# Patient Record
Sex: FEMALE | Race: WHITE | NOT HISPANIC OR LATINO | Employment: FULL TIME | URBAN - METROPOLITAN AREA
[De-identification: names, ages, dates, MRNs, and addresses within clinical notes are randomized per-mention and may not be internally consistent; named-entity substitution may affect disease eponyms.]

---

## 2023-02-15 ENCOUNTER — TELEPHONE (OUTPATIENT)
Dept: OBGYN CLINIC | Facility: HOSPITAL | Age: 58
End: 2023-02-15

## 2023-02-15 NOTE — TELEPHONE ENCOUNTER
Patient is being referred to a orthopedics. Please schedule accordingly.     [address] [facility]   [phone]

## 2023-02-20 ENCOUNTER — HOSPITAL ENCOUNTER (OUTPATIENT)
Dept: RADIOLOGY | Facility: HOSPITAL | Age: 58
Discharge: HOME | End: 2023-02-20
Attending: STUDENT IN AN ORGANIZED HEALTH CARE EDUCATION/TRAINING PROGRAM

## 2023-02-20 ENCOUNTER — OFFICE VISIT (OUTPATIENT)
Dept: LAB | Facility: HOSPITAL | Age: 58
End: 2023-02-20
Attending: STUDENT IN AN ORGANIZED HEALTH CARE EDUCATION/TRAINING PROGRAM

## 2023-02-20 ENCOUNTER — OFFICE VISIT (OUTPATIENT)
Dept: OBGYN CLINIC | Facility: CLINIC | Age: 58
End: 2023-02-20

## 2023-02-20 ENCOUNTER — HOSPITAL ENCOUNTER (OUTPATIENT)
Dept: CT IMAGING | Facility: HOSPITAL | Age: 58
Discharge: HOME | End: 2023-02-20
Attending: STUDENT IN AN ORGANIZED HEALTH CARE EDUCATION/TRAINING PROGRAM

## 2023-02-20 VITALS
HEIGHT: 69 IN | HEART RATE: 73 BPM | DIASTOLIC BLOOD PRESSURE: 85 MMHG | WEIGHT: 252.3 LBS | BODY MASS INDEX: 37.37 KG/M2 | SYSTOLIC BLOOD PRESSURE: 154 MMHG

## 2023-02-20 DIAGNOSIS — S52.602A CLOSED FRACTURE DISTAL RADIUS AND ULNA, LEFT, INITIAL ENCOUNTER: ICD-10-CM

## 2023-02-20 DIAGNOSIS — S52.502A CLOSED FRACTURE DISTAL RADIUS AND ULNA, LEFT, INITIAL ENCOUNTER: ICD-10-CM

## 2023-02-20 DIAGNOSIS — S52.502A CLOSED FRACTURE DISTAL RADIUS AND ULNA, LEFT, INITIAL ENCOUNTER: Primary | ICD-10-CM

## 2023-02-20 DIAGNOSIS — S52.602A CLOSED FRACTURE DISTAL RADIUS AND ULNA, LEFT, INITIAL ENCOUNTER: Primary | ICD-10-CM

## 2023-02-20 DIAGNOSIS — S52.502A DISTAL RADIUS FRACTURE, LEFT: ICD-10-CM

## 2023-02-20 LAB
ANION GAP SERPL CALCULATED.3IONS-SCNC: 8 MMOL/L (ref 4–13)
BASOPHILS # BLD AUTO: 0.02 THOUSANDS/ÂΜL (ref 0–0.1)
BASOPHILS NFR BLD AUTO: 0 % (ref 0–1)
BUN SERPL-MCNC: 13 MG/DL (ref 5–25)
CALCIUM SERPL-MCNC: 9.5 MG/DL (ref 8.4–10.2)
CHLORIDE SERPL-SCNC: 107 MMOL/L (ref 96–108)
CO2 SERPL-SCNC: 27 MMOL/L (ref 21–32)
CREAT SERPL-MCNC: 0.71 MG/DL (ref 0.6–1.3)
EOSINOPHIL # BLD AUTO: 0.07 THOUSAND/ÂΜL (ref 0–0.61)
EOSINOPHIL NFR BLD AUTO: 1 % (ref 0–6)
ERYTHROCYTE [DISTWIDTH] IN BLOOD BY AUTOMATED COUNT: 13.2 % (ref 11.6–15.1)
GFR SERPL CREATININE-BSD FRML MDRD: 94 ML/MIN/1.73SQ M
GLUCOSE P FAST SERPL-MCNC: 103 MG/DL (ref 65–99)
HCT VFR BLD AUTO: 39.7 % (ref 34.8–46.1)
HGB BLD-MCNC: 13 G/DL (ref 11.5–15.4)
IMM GRANULOCYTES # BLD AUTO: 0.02 THOUSAND/UL (ref 0–0.2)
IMM GRANULOCYTES NFR BLD AUTO: 0 % (ref 0–2)
LYMPHOCYTES # BLD AUTO: 2.52 THOUSANDS/ÂΜL (ref 0.6–4.47)
LYMPHOCYTES NFR BLD AUTO: 32 % (ref 14–44)
MCH RBC QN AUTO: 29.5 PG (ref 26.8–34.3)
MCHC RBC AUTO-ENTMCNC: 32.7 G/DL (ref 31.4–37.4)
MCV RBC AUTO: 90 FL (ref 82–98)
MONOCYTES # BLD AUTO: 0.62 THOUSAND/ÂΜL (ref 0.17–1.22)
MONOCYTES NFR BLD AUTO: 8 % (ref 4–12)
NEUTROPHILS # BLD AUTO: 4.69 THOUSANDS/ÂΜL (ref 1.85–7.62)
NEUTS SEG NFR BLD AUTO: 59 % (ref 43–75)
NRBC BLD AUTO-RTO: 0 /100 WBCS
PLATELET # BLD AUTO: 347 THOUSANDS/UL (ref 149–390)
PMV BLD AUTO: 8.7 FL (ref 8.9–12.7)
POTASSIUM SERPL-SCNC: 3.9 MMOL/L (ref 3.5–5.3)
RBC # BLD AUTO: 4.41 MILLION/UL (ref 3.81–5.12)
SODIUM SERPL-SCNC: 142 MMOL/L (ref 135–147)
WBC # BLD AUTO: 7.94 THOUSAND/UL (ref 4.31–10.16)

## 2023-02-20 RX ORDER — CHLORHEXIDINE GLUCONATE 0.12 MG/ML
15 RINSE ORAL ONCE
Status: CANCELLED | OUTPATIENT
Start: 2023-02-20 | End: 2023-02-20

## 2023-02-20 NOTE — H&P (VIEW-ONLY)
ORTHOPAEDIC HAND, WRIST, AND ELBOW OFFICE  VISIT       ASSESSMENT/PLAN:      Diagnoses and all orders for this visit:    Closed fracture distal radius and ulna, left, initial encounter  -     XR wrist 3+ vw left; Future  -     Cancel: CT upper extremity w wo contrast left; Future  -     Splint application  -     Case request operating room: OPEN REDUCTION W/ INTERNAL FIXATION (ORIF) RADIUS; Standing  -     Basic metabolic panel; Future  -     CBC and differential; Future  -     EKG 12 lead; Future  -     Case request operating room: OPEN REDUCTION W/ INTERNAL FIXATION (ORIF) RADIUS    Distal radius fracture, left  -     Ambulatory Referral to Orthopedic Surgery    Other orders  -     Cancel: Fracture / Dislocation Treatment  -     Diet NPO; Sips with meds; Standing  -     Nursing Communication [address] [place] [address] Interventions Implemented; Standing  -     Nursing Communication [facility] bath, have staff wash entire body (neck down) per pre-op bathing protocol  Routine, evening prior to, and day of surgery ; Standing  -     Nursing Communication Swab both nares with Povidone-Iodine solution, EXCLUDE if patient has shellfish/Iodine allergy  Routine, day of surgery, on call to OR; Standing  -     chlorhexidine (PERIDEX) 0 12 % oral rinse 15 mL  -     Void on call to OR; Standing  -     Insert peripheral IV; Standing  -     ceFAZolin (ANCEF) 2,000 mg in dextrose 5 % 100 mL IVPB      61year old female with left distal radius fracture, right elbow and right wrist pain all sustained after a fall on 2/15/2023  Due to displacement of fracture fragment, as well as intraarticular involvement with volar translation of fracture fragments, recommend operative ORIF of left wrist distal radius fracture, which the patient is agreeable to  Risks and benefits were discussed   Risks of the surgery are inclusive of but not limited to bleeding, infection, nerve injury, blood clot, worsening of symptoms, not achieving the anticipated results, persistent stiffness, weakness and the need for additional surgery  The patient verbally stated they understood those risks and would like to proceed with the surgery  Surgical consent was signed in the office today  I will see the patient the day of surgery  May return to work without use of left hand  Order placed for patient to obtain STAT CT scan of left wrist to further evaluate fracture fragments and their alignment prior to surgery  Patient placed in Orthoglass sugar tong splint today to be worn at all times  Follow up post op        The patient verbalized understanding of exam findings and treatment plan  We engaged in the shared decision-making process and treatment options were discussed at length with the patient  Surgical and conservative management discussed today along with risks and benefits  Follow Up:  Return for post-op with x-rays  Raj Jacobs MD  Attending, Orthopaedic Surgery  Hand, Wrist, and Elbow Surgery  [address] [place] [address]    ____________________________________________________________________________________________________________________________________________      CHIEF COMPLAINT:  Chief Complaint   Patient presents with   • Left Wrist - Pain   • Right Wrist - Pain   • Right Elbow - Pain       SUBJECTIVE:  Reg Mckee is a 61y o  year old RHD female who presents today for evaluation and treatment of left distal radius and ulnar styloid process fractures sustained 2/15/2023  She states she fell forwards onto her bilateral wrists, also is experiencing right elbow pain, right wrist pain, ribcage pain  Immediate pain and dysfunction led her to seek care at the ED where x-rays were obtained, a toradol injection was administered, and she was placed into a short arm splint for the right distal radius fractures  She was referred by Dr Tiffanie Yao DO for follow up evaluation and treatment of her wrist fractures   Today she notes pain of the right elbow, right wrist, left wrist  Denies paraesthesias bilateral upper extremities  Right wrist pain aggravated with movement, describes pain palmar and ulnar aspects of the wrist         I have personally reviewed all the relevant PMH, PSH, SH, FH, Medications and allergies      PAST MEDICAL HISTORY:  Past Medical History:   Diagnosis Date   • Bilateral fibrocystic breast changes    • Costochondritis    • Dense breasts    • Headache    • Hemorrhoids    • Loss of height    • Sinusitis    • Urinary frequency    • Wears glasses    • Wears glasses        PAST SURGICAL HISTORY:  Past Surgical History:   Procedure Laterality Date   • COLONOSCOPY     • INCONTINENCE SURGERY     • NM COLONOSCOPY FLX DX W/COLLJ SPEC WHEN PFRMD N/A 10/4/2018    Procedure: COLONOSCOPY;  Surgeon: Dee Barajas MD;  [facility];   Service: Gastroenterology   • TUBAL LIGATION     • UMBILICAL HERNIA REPAIR         FAMILY HISTORY:  Family History   Problem Relation Age of Onset   • Anemia Mother    • Breast cancer Mother         AGE DX UNK   • Coronary artery disease Father    • Melanoma Father         malignant of skin AGE DX UNK   • Leukemia Father         AGE DX UNK   • Breast cancer Sister         AGE DX UNK   • No Known Problems Daughter    • No Known Problems Maternal Grandmother    • No Known Problems Maternal Grandfather    • No Known Problems Paternal Grandmother    • No Known Problems Paternal Grandfather    • No Known Problems Sister    • No Known Problems Brother    • No Known Problems Daughter    • Ovarian cancer Maternal Aunt 79   • Melanoma Paternal Uncle    • No Known Problems Paternal Aunt    • No Known Problems Paternal Aunt        SOCIAL HISTORY:  Social History     Tobacco Use   • Smoking status: Never   • Smokeless tobacco: Never   Vaping Use   • Vaping Use: Never used   Substance Use Topics   • Alcohol use: Yes     Comment: occasional weekends   • Drug use: No       MEDICATIONS:    Current Outpatient Medications:   •  Cholecalciferol (VITAMIN D3) 1000 units CAPS, Take 2,000 Units by mouth daily  , Disp: , Rfl:   •  co-enzyme Q-10 30 MG capsule, Take 30 mg by mouth 3 (three) times a day, Disp: , Rfl:   •  Garlic 0803 MG CAPS, Take by mouth, Disp: , Rfl:   •  multivitamin (THERAGRAN) TABS, Take 1 tablet by mouth daily, Disp: , Rfl:   •  Omega-3 Fatty Acids (fish oil) 1,000 mg, Take 1,000 mg by mouth daily, Disp: , Rfl:   •  predniSONE 10 mg tablet, Take 4 tablets for 2 days, 3 tablets for 2 days, 2 tablets for 2 days, 1 tablet for 2 days, 0 5 tablet for 2 days, Disp: 20 tablet, Rfl: 0  •  triamcinolone (KENALOG) 0 5 % cream, Apply topically 3 (three) times a day, Disp: 454 g, Rfl: 0    ALLERGIES:  No Known Allergies      Review of Systems  Pertinent items are noted in HPI  All other systems were reviewed and are negative  VITALS:  Vitals:    02/20/23 1127   BP: 154/85   Pulse: 73       LABS:  HgA1c:   Lab Results   Component Value Date    HGBA1C 5 8 09/19/2018     BMP:   Lab Results   Component Value Date    CALCIUM 9 5 02/20/2023    K 3 9 02/20/2023    CO2 27 02/20/2023     02/20/2023    BUN 13 02/20/2023    CREATININE 0 71 02/20/2023       _____________________________________________________  PHYSICAL EXAMINATION:  General: well developed and well nourished, alert, oriented times 3 and appears comfortable  Psychiatric: Normal  HEENT: Normocephalic, Atraumatic Trachea Midline, No torticollis  Pulmonary: No audible wheezing or respiratory distress   Abdomen/GI: Non tender, non distended   Cardiovascular: No pitting edema, 2+ radial pulse   Skin: No masses, erythema, lacerations, fluctation, ulcerations  Neurovascular: Sensation Intact to the Median, Ulnar, Radial Nerve, Motor Intact to the Median, Ulnar, Radial Nerve and Pulses Intact  Musculoskeletal: Normal, except as noted in detailed exam and in HPI        MUSCULOSKELETAL EXAMINATION:    Right wrist:  Ecchymosis, minor edema noted volar and dorsal aspects  Wrist flexion limited due to pain  Able to achieve composite fist  Brisk capillary refill  Sensation intact to Ax/R/M/U nerve distributions    Right elbow:  No visible deformity, ecchymosis  Minor edema  Range of motion , able to achieve pronation and supination with pain at end range of supination  Tenderness with palpation of cubital fossa    Left wrist:  Moderate ecchymosis, edema  Tender with palpation of fracture site  Able to achieve minimal range of motion of digits  Brisk capillary refill  Sensation intact to Ax/R/M/U nerve distributions      ___________________________________________________  STUDIES REVIEWED:  X-rays of the left wrist were reviewed and demonstrate: ulnar styloid process, intra-articular distal radius fracture with volar displacement of fracture fragment  X-rays of the right wrist were reviewed and demonstrate: no acute osseous abnormalities  X-rays of the right elbow were reviewed and demonstrate:    PROCEDURES PERFORMED:  Splint application    Date/Time: 2/20/2023 12:38 PM  Performed by: Ramsey Hannah MD  Authorized by: Ramsey Hannah MD   Universal Protocol:  Consent: Verbal consent obtained  Risks and benefits: risks, benefits and alternatives were discussed  Consent given by: patient  Time out: Immediately prior to procedure a "time out" was called to verify the correct patient, procedure, equipment, support staff and site/side marked as required  Patient understanding: patient states understanding of the procedure being performed  Site marked: the operative site was marked  Patient identity confirmed: verbally with patient      Pre-procedure details:     Sensation:  Normal  Procedure details:     Laterality:  Left    Location:  Wrist    Wrist:  L wrist    Splint type:  Sugar tong    Supplies:  Ortho-Glass, elastic bandage, skin protective strip and cotton padding  Post-procedure details:     Pain:  Unchanged    Sensation:  Normal    Patient tolerance of procedure:   Tolerated well, no immediate complications           _____________________________________________________      Mahad Ken    I,:  Jasmin Bagley am acting as a scribe while in the presence of the attending physician :       I,:  Krysten Johnston MD personally performed the services described in this documentation    as scribed in my presence :

## 2023-02-20 NOTE — PROGRESS NOTES
ORTHOPAEDIC HAND, WRIST, AND ELBOW OFFICE  VISIT       ASSESSMENT/PLAN:      Diagnoses and all orders for this visit:    Closed fracture distal radius and ulna, left, initial encounter  -     XR wrist 3+ vw left; Future  -     Cancel: CT upper extremity w wo contrast left; Future  -     Splint application  -     Case request operating room: OPEN REDUCTION W/ INTERNAL FIXATION (ORIF) RADIUS; Standing  -     Basic metabolic panel; Future  -     CBC and differential; Future  -     EKG 12 lead; Future  -     Case request operating room: OPEN REDUCTION W/ INTERNAL FIXATION (ORIF) RADIUS    Distal radius fracture, left  -     Ambulatory Referral to Orthopedic Surgery    Other orders  -     Cancel: Fracture / Dislocation Treatment  -     Diet NPO; Sips with meds; Standing  -     Nursing Communication [address] [place] [address] Interventions Implemented; Standing  -     Nursing Communication [facility] bath, have staff wash entire body (neck down) per pre-op bathing protocol  Routine, evening prior to, and day of surgery ; Standing  -     Nursing Communication Swab both nares with Povidone-Iodine solution, EXCLUDE if patient has shellfish/Iodine allergy  Routine, day of surgery, on call to OR; Standing  -     chlorhexidine (PERIDEX) 0 12 % oral rinse 15 mL  -     Void on call to OR; Standing  -     Insert peripheral IV; Standing  -     ceFAZolin (ANCEF) 2,000 mg in dextrose 5 % 100 mL IVPB      61year old female with left distal radius fracture, right elbow and right wrist pain all sustained after a fall on 2/15/2023  Due to displacement of fracture fragment, as well as intraarticular involvement with volar translation of fracture fragments, recommend operative ORIF of left wrist distal radius fracture, which the patient is agreeable to  Risks and benefits were discussed   Risks of the surgery are inclusive of but not limited to bleeding, infection, nerve injury, blood clot, worsening of symptoms, not achieving the anticipated results, persistent stiffness, weakness and the need for additional surgery  The patient verbally stated they understood those risks and would like to proceed with the surgery  Surgical consent was signed in the office today  I will see the patient the day of surgery  May return to work without use of left hand  Order placed for patient to obtain STAT CT scan of left wrist to further evaluate fracture fragments and their alignment prior to surgery  Patient placed in Orthoglass sugar tong splint today to be worn at all times  Follow up post op        The patient verbalized understanding of exam findings and treatment plan  We engaged in the shared decision-making process and treatment options were discussed at length with the patient  Surgical and conservative management discussed today along with risks and benefits  Follow Up:  Return for post-op with x-rays  Kimberlee Crespo MD  Attending, Orthopaedic Surgery  Hand, Wrist, and Elbow Surgery  [address] [place] [address]    ____________________________________________________________________________________________________________________________________________      CHIEF COMPLAINT:  Chief Complaint   Patient presents with   • Left Wrist - Pain   • Right Wrist - Pain   • Right Elbow - Pain       SUBJECTIVE:  Manuel Roberts is a 61y o  year old RHD female who presents today for evaluation and treatment of left distal radius and ulnar styloid process fractures sustained 2/15/2023  She states she fell forwards onto her bilateral wrists, also is experiencing right elbow pain, right wrist pain, ribcage pain  Immediate pain and dysfunction led her to seek care at the ED where x-rays were obtained, a toradol injection was administered, and she was placed into a short arm splint for the right distal radius fractures  She was referred by Dr Cici Holland DO for follow up evaluation and treatment of her wrist fractures   Today she notes pain of the right elbow, right wrist, left wrist  Denies paraesthesias bilateral upper extremities  Right wrist pain aggravated with movement, describes pain palmar and ulnar aspects of the wrist         I have personally reviewed all the relevant PMH, PSH, SH, FH, Medications and allergies      PAST MEDICAL HISTORY:  Past Medical History:   Diagnosis Date   • Bilateral fibrocystic breast changes    • Costochondritis    • Dense breasts    • Headache    • Hemorrhoids    • Loss of height    • Sinusitis    • Urinary frequency    • Wears glasses    • Wears glasses        PAST SURGICAL HISTORY:  Past Surgical History:   Procedure Laterality Date   • COLONOSCOPY     • INCONTINENCE SURGERY     • CA COLONOSCOPY FLX DX W/COLLJ SPEC WHEN PFRMD N/A 10/4/2018    Procedure: COLONOSCOPY;  Surgeon: Sudeep Sierra MD;  Location: [facility];   Service: Gastroenterology   • TUBAL LIGATION     • UMBILICAL HERNIA REPAIR         FAMILY HISTORY:  Family History   Problem Relation Age of Onset   • Anemia Mother    • Breast cancer Mother         AGE DX UNK   • Coronary artery disease Father    • Melanoma Father         malignant of skin AGE DX UNK   • Leukemia Father         AGE DX UNK   • Breast cancer Sister         AGE DX UNK   • No Known Problems Daughter    • No Known Problems Maternal Grandmother    • No Known Problems Maternal Grandfather    • No Known Problems Paternal Grandmother    • No Known Problems Paternal Grandfather    • No Known Problems Sister    • No Known Problems Brother    • No Known Problems Daughter    • Ovarian cancer Maternal Aunt 79   • Melanoma Paternal Uncle    • No Known Problems Paternal Aunt    • No Known Problems Paternal Aunt        SOCIAL HISTORY:  Social History     Tobacco Use   • Smoking status: Never   • Smokeless tobacco: Never   Vaping Use   • Vaping Use: Never used   Substance Use Topics   • Alcohol use: Yes     Comment: occasional weekends   • Drug use: No       MEDICATIONS:    Current Outpatient Medications:   •  Cholecalciferol (VITAMIN D3) 1000 units CAPS, Take 2,000 Units by mouth daily  , Disp: , Rfl:   •  co-enzyme Q-10 30 MG capsule, Take 30 mg by mouth 3 (three) times a day, Disp: , Rfl:   •  Garlic 6276 MG CAPS, Take by mouth, Disp: , Rfl:   •  multivitamin (THERAGRAN) TABS, Take 1 tablet by mouth daily, Disp: , Rfl:   •  Omega-3 Fatty Acids (fish oil) 1,000 mg, Take 1,000 mg by mouth daily, Disp: , Rfl:   •  predniSONE 10 mg tablet, Take 4 tablets for 2 days, 3 tablets for 2 days, 2 tablets for 2 days, 1 tablet for 2 days, 0 5 tablet for 2 days, Disp: 20 tablet, Rfl: 0  •  triamcinolone (KENALOG) 0 5 % cream, Apply topically 3 (three) times a day, Disp: 454 g, Rfl: 0    ALLERGIES:  No Known Allergies      Review of Systems  Pertinent items are noted in HPI  All other systems were reviewed and are negative  VITALS:  Vitals:    02/20/23 1127   BP: 154/85   Pulse: 73       LABS:  HgA1c:   Lab Results   Component Value Date    HGBA1C 5 8 09/19/2018     BMP:   Lab Results   Component Value Date    CALCIUM 9 5 02/20/2023    K 3 9 02/20/2023    CO2 27 02/20/2023     02/20/2023    BUN 13 02/20/2023    CREATININE 0 71 02/20/2023       _____________________________________________________  PHYSICAL EXAMINATION:  General: well developed and well nourished, alert, oriented times 3 and appears comfortable  Psychiatric: Normal  HEENT: Normocephalic, Atraumatic Trachea Midline, No torticollis  Pulmonary: No audible wheezing or respiratory distress   Abdomen/GI: Non tender, non distended   Cardiovascular: No pitting edema, 2+ radial pulse   Skin: No masses, erythema, lacerations, fluctation, ulcerations  Neurovascular: Sensation Intact to the Median, Ulnar, Radial Nerve, Motor Intact to the Median, Ulnar, Radial Nerve and Pulses Intact  Musculoskeletal: Normal, except as noted in detailed exam and in HPI        MUSCULOSKELETAL EXAMINATION:    Right wrist:  Ecchymosis, minor edema noted volar and dorsal aspects  Wrist flexion limited due to pain  Able to achieve composite fist  Brisk capillary refill  Sensation intact to Ax/R/M/U nerve distributions    Right elbow:  No visible deformity, ecchymosis  Minor edema  Range of motion , able to achieve pronation and supination with pain at end range of supination  Tenderness with palpation of cubital fossa    Left wrist:  Moderate ecchymosis, edema  Tender with palpation of fracture site  Able to achieve minimal range of motion of digits  Brisk capillary refill  Sensation intact to Ax/R/M/U nerve distributions      ___________________________________________________  STUDIES REVIEWED:  X-rays of the left wrist were reviewed and demonstrate: ulnar styloid process, intra-articular distal radius fracture with volar displacement of fracture fragment  X-rays of the right wrist were reviewed and demonstrate: no acute osseous abnormalities  X-rays of the right elbow were reviewed and demonstrate:    PROCEDURES PERFORMED:  Splint application    Date/Time: 2/20/2023 12:38 PM  Performed by: Gege Gamez MD  Authorized by: Gege Gamez MD   Universal Protocol:  Consent: Verbal consent obtained  Risks and benefits: risks, benefits and alternatives were discussed  Consent given by: patient  Time out: Immediately prior to procedure a "time out" was called to verify the correct patient, procedure, equipment, support staff and site/side marked as required  Patient understanding: patient states understanding of the procedure being performed  Site marked: the operative site was marked  Patient identity confirmed: verbally with patient      Pre-procedure details:     Sensation:  Normal  Procedure details:     Laterality:  Left    Location:  Wrist    Wrist:  L wrist    Splint type:  Sugar tong    Supplies:  Ortho-Glass, elastic bandage, skin protective strip and cotton padding  Post-procedure details:     Pain:  Unchanged    Sensation:  Normal    Patient tolerance of procedure:   Tolerated well, no immediate complications           _____________________________________________________      Mimi Echeverria    I,:  Holden Perez am acting as a scribe while in the presence of the attending physician :       I,:  Lay Perdomo MD personally performed the services described in this documentation    as scribed in my presence :

## 2023-02-21 ENCOUNTER — ANESTHESIA EVENT (OUTPATIENT)
Dept: PERIOP | Facility: AMBULARY SURGERY CENTER | Age: 58
End: 2023-02-21

## 2023-02-21 LAB
ATRIAL RATE: 66 BPM
P AXIS: 21 DEGREES
PR INTERVAL: 144 MS
QRS AXIS: 62 DEGREES
QRSD INTERVAL: 82 MS
QT INTERVAL: 420 MS
QTC INTERVAL: 440 MS
T WAVE AXIS: 10 DEGREES
VENTRICULAR RATE: 66 BPM

## 2023-02-21 NOTE — PRE-PROCEDURE INSTRUCTIONS
Pre-Surgery Instructions:   Medication Instructions   • Cholecalciferol (VITAMIN D3) 1000 units CAPS Hold day of surgery  • co-enzyme Q-10 30 MG capsule Hold day of surgery  • Garlic 2080 MG CAPS Hold day of surgery  • multivitamin (THERAGRAN) TABS Hold day of surgery  • Omega-3 Fatty Acids (fish oil) 1,000 mg Hold day of surgery  Pre op instructions per My Surgical Experience booklet,medications per anesthesia guidelines and showering instructions per Jamil Segovia protocol reviewed-Patient has CHG  Pt  Verbalized understanding of current visitor restrictions  Instructed to call surgeon with any illness,change in health or covid exposure now till DOS  All medications instructed to take DOS are with sips water only  Instructed to avoid all ASA/NSAIDs and OTC Vit/Supp from now until after surgery per anesthesia guidelines  Tylenol ok prn  Pt  Verbalized an understanding of all instructions reviewed and offers no concerns at this time

## 2023-02-22 ENCOUNTER — APPOINTMENT (OUTPATIENT)
Dept: RADIOLOGY | Facility: AMBULARY SURGERY CENTER | Age: 58
End: 2023-02-22

## 2023-02-22 ENCOUNTER — HOSPITAL ENCOUNTER (OUTPATIENT)
Facility: AMBULARY SURGERY CENTER | Age: 58
Setting detail: OUTPATIENT SURGERY
Discharge: HOME | End: 2023-02-22
Attending: STUDENT IN AN ORGANIZED HEALTH CARE EDUCATION/TRAINING PROGRAM | Admitting: STUDENT IN AN ORGANIZED HEALTH CARE EDUCATION/TRAINING PROGRAM

## 2023-02-22 ENCOUNTER — ANESTHESIA (OUTPATIENT)
Dept: PERIOP | Facility: AMBULARY SURGERY CENTER | Age: 58
End: 2023-02-22

## 2023-02-22 VITALS
HEIGHT: 69 IN | OXYGEN SATURATION: 97 % | DIASTOLIC BLOOD PRESSURE: 81 MMHG | BODY MASS INDEX: 37 KG/M2 | SYSTOLIC BLOOD PRESSURE: 135 MMHG | WEIGHT: 249.8 LBS | HEART RATE: 67 BPM | TEMPERATURE: 97.1 F | RESPIRATION RATE: 18 BRPM

## 2023-02-22 DIAGNOSIS — Z87.81 S/P ORIF (OPEN REDUCTION INTERNAL FIXATION) FRACTURE: Primary | ICD-10-CM

## 2023-02-22 DIAGNOSIS — Z98.890 S/P ORIF (OPEN REDUCTION INTERNAL FIXATION) FRACTURE: Primary | ICD-10-CM

## 2023-02-22 DEVICE — 2.3MM X 20MM LOCKING CORTICAL SCREW
Type: IMPLANTABLE DEVICE | Site: WRIST | Status: FUNCTIONAL
Brand: ACUMED

## 2023-02-22 DEVICE — 2.3MM X 16MM LOCKING CORTICAL SCREW
Type: IMPLANTABLE DEVICE | Site: WRIST | Status: FUNCTIONAL
Brand: ACUMED

## 2023-02-22 DEVICE — 3.5MM X 12MM NON-LOCKING HEXALOBE SCREW
Type: IMPLANTABLE DEVICE | Site: WRIST | Status: FUNCTIONAL
Brand: ACUMED

## 2023-02-22 DEVICE — 2.3MM X 18MM LOCKING CORTICAL SCREW
Type: IMPLANTABLE DEVICE | Site: WRIST | Status: FUNCTIONAL
Brand: ACUMED

## 2023-02-22 DEVICE — ACU-LOC® 2 VDR PLT STD L
Type: IMPLANTABLE DEVICE | Site: WRIST | Status: FUNCTIONAL
Brand: ACUMED

## 2023-02-22 RX ORDER — PROPOFOL 10 MG/ML
INJECTION, EMULSION INTRAVENOUS AS NEEDED
Status: DISCONTINUED | OUTPATIENT
Start: 2023-02-22 | End: 2023-02-22

## 2023-02-22 RX ORDER — FENTANYL CITRATE 50 UG/ML
INJECTION, SOLUTION INTRAMUSCULAR; INTRAVENOUS AS NEEDED
Status: DISCONTINUED | OUTPATIENT
Start: 2023-02-22 | End: 2023-02-22

## 2023-02-22 RX ORDER — CEFAZOLIN SODIUM 2 G/50ML
SOLUTION INTRAVENOUS AS NEEDED
Status: DISCONTINUED | OUTPATIENT
Start: 2023-02-22 | End: 2023-02-22

## 2023-02-22 RX ORDER — MIDAZOLAM HYDROCHLORIDE 2 MG/2ML
INJECTION, SOLUTION INTRAMUSCULAR; INTRAVENOUS AS NEEDED
Status: DISCONTINUED | OUTPATIENT
Start: 2023-02-22 | End: 2023-02-22

## 2023-02-22 RX ORDER — SODIUM CHLORIDE, SODIUM LACTATE, POTASSIUM CHLORIDE, CALCIUM CHLORIDE 600; 310; 30; 20 MG/100ML; MG/100ML; MG/100ML; MG/100ML
INJECTION, SOLUTION INTRAVENOUS CONTINUOUS PRN
Status: DISCONTINUED | OUTPATIENT
Start: 2023-02-22 | End: 2023-02-22

## 2023-02-22 RX ORDER — ROPIVACAINE HYDROCHLORIDE 5 MG/ML
INJECTION, SOLUTION EPIDURAL; INFILTRATION; PERINEURAL
Status: DISCONTINUED | OUTPATIENT
Start: 2023-02-22 | End: 2023-02-22

## 2023-02-22 RX ORDER — LIDOCAINE HYDROCHLORIDE 10 MG/ML
INJECTION, SOLUTION EPIDURAL; INFILTRATION; INTRACAUDAL; PERINEURAL AS NEEDED
Status: DISCONTINUED | OUTPATIENT
Start: 2023-02-22 | End: 2023-02-22

## 2023-02-22 RX ORDER — OXYCODONE HYDROCHLORIDE AND ACETAMINOPHEN 5; 325 MG/1; MG/1
1 TABLET ORAL EVERY 6 HOURS PRN
Qty: 20 TABLET | Refills: 0 | Status: SHIPPED | OUTPATIENT
Start: 2023-02-22

## 2023-02-22 RX ORDER — HYDROMORPHONE HCL/PF 1 MG/ML
0.5 SYRINGE (ML) INJECTION
Status: DISCONTINUED | OUTPATIENT
Start: 2023-02-22 | End: 2023-02-22 | Stop reason: HOSPADM

## 2023-02-22 RX ORDER — OXYCODONE HYDROCHLORIDE 5 MG/1
5 TABLET ORAL EVERY 6 HOURS PRN
Status: DISCONTINUED | OUTPATIENT
Start: 2023-02-22 | End: 2023-02-22 | Stop reason: HOSPADM

## 2023-02-22 RX ORDER — CEFAZOLIN SODIUM 2 G/50ML
2000 SOLUTION INTRAVENOUS ONCE
Status: DISCONTINUED | OUTPATIENT
Start: 2023-02-22 | End: 2023-02-22 | Stop reason: HOSPADM

## 2023-02-22 RX ORDER — GLYCOPYRROLATE 0.2 MG/ML
INJECTION INTRAMUSCULAR; INTRAVENOUS AS NEEDED
Status: DISCONTINUED | OUTPATIENT
Start: 2023-02-22 | End: 2023-02-22

## 2023-02-22 RX ORDER — MAGNESIUM HYDROXIDE 1200 MG/15ML
LIQUID ORAL AS NEEDED
Status: DISCONTINUED | OUTPATIENT
Start: 2023-02-22 | End: 2023-02-22 | Stop reason: HOSPADM

## 2023-02-22 RX ORDER — CHLORHEXIDINE GLUCONATE 0.12 MG/ML
15 RINSE ORAL ONCE
Status: DISCONTINUED | OUTPATIENT
Start: 2023-02-22 | End: 2023-02-22 | Stop reason: HOSPADM

## 2023-02-22 RX ORDER — PROPOFOL 10 MG/ML
INJECTION, EMULSION INTRAVENOUS CONTINUOUS PRN
Status: DISCONTINUED | OUTPATIENT
Start: 2023-02-22 | End: 2023-02-22

## 2023-02-22 RX ORDER — ONDANSETRON 2 MG/ML
INJECTION INTRAMUSCULAR; INTRAVENOUS AS NEEDED
Status: DISCONTINUED | OUTPATIENT
Start: 2023-02-22 | End: 2023-02-22

## 2023-02-22 RX ORDER — KETOROLAC TROMETHAMINE 30 MG/ML
30 INJECTION, SOLUTION INTRAMUSCULAR; INTRAVENOUS ONCE AS NEEDED
Status: DISCONTINUED | OUTPATIENT
Start: 2023-02-22 | End: 2023-02-22 | Stop reason: HOSPADM

## 2023-02-22 RX ORDER — DEXAMETHASONE SODIUM PHOSPHATE 10 MG/ML
INJECTION, SOLUTION INTRAMUSCULAR; INTRAVENOUS AS NEEDED
Status: DISCONTINUED | OUTPATIENT
Start: 2023-02-22 | End: 2023-02-22

## 2023-02-22 RX ORDER — METOCLOPRAMIDE HYDROCHLORIDE 5 MG/ML
10 INJECTION INTRAMUSCULAR; INTRAVENOUS ONCE AS NEEDED
Status: DISCONTINUED | OUTPATIENT
Start: 2023-02-22 | End: 2023-02-22 | Stop reason: HOSPADM

## 2023-02-22 RX ADMIN — FENTANYL CITRATE 25 MCG: 50 INJECTION INTRAMUSCULAR; INTRAVENOUS at 08:27

## 2023-02-22 RX ADMIN — Medication 20 MG: at 08:21

## 2023-02-22 RX ADMIN — LIDOCAINE HYDROCHLORIDE 50 MG: 10 INJECTION, SOLUTION EPIDURAL; INFILTRATION; INTRACAUDAL at 08:04

## 2023-02-22 RX ADMIN — FENTANYL CITRATE 25 MCG: 50 INJECTION INTRAMUSCULAR; INTRAVENOUS at 08:46

## 2023-02-22 RX ADMIN — FENTANYL CITRATE 25 MCG: 50 INJECTION INTRAMUSCULAR; INTRAVENOUS at 09:15

## 2023-02-22 RX ADMIN — MIDAZOLAM 2 MG: 1 INJECTION INTRAMUSCULAR; INTRAVENOUS at 07:08

## 2023-02-22 RX ADMIN — GLYCOPYRROLATE 0.2 MG: 0.2 INJECTION INTRAMUSCULAR; INTRAVENOUS at 08:20

## 2023-02-22 RX ADMIN — PROPOFOL 100 MCG/KG/MIN: 10 INJECTION, EMULSION INTRAVENOUS at 08:04

## 2023-02-22 RX ADMIN — FENTANYL CITRATE 25 MCG: 50 INJECTION INTRAMUSCULAR; INTRAVENOUS at 07:08

## 2023-02-22 RX ADMIN — PROPOFOL 50 MG: 10 INJECTION, EMULSION INTRAVENOUS at 08:04

## 2023-02-22 RX ADMIN — DEXAMETHASONE SODIUM PHOSPHATE 10 MG: 10 INJECTION, SOLUTION INTRAMUSCULAR; INTRAVENOUS at 08:55

## 2023-02-22 RX ADMIN — CEFAZOLIN SODIUM 2000 MG: 2 SOLUTION INTRAVENOUS at 08:00

## 2023-02-22 RX ADMIN — ROPIVACAINE HYDROCHLORIDE 25 ML: 5 INJECTION, SOLUTION EPIDURAL; INFILTRATION; PERINEURAL at 07:10

## 2023-02-22 RX ADMIN — SODIUM CHLORIDE, SODIUM LACTATE, POTASSIUM CHLORIDE, AND CALCIUM CHLORIDE: .6; .31; .03; .02 INJECTION, SOLUTION INTRAVENOUS at 08:00

## 2023-02-22 RX ADMIN — ONDANSETRON 4 MG: 2 INJECTION INTRAMUSCULAR; INTRAVENOUS at 09:33

## 2023-02-22 NOTE — DISCHARGE INSTR - AVS FIRST PAGE
Post Operative Instructions    You have had surgery on your arm today, please read and follow the information below:  Elevate your hand above your elbow during the next 24-48 hours to help with swelling  Place your hand and arm over your head with motion at your shoulder three times a day  Do not apply any cream/ointment/oil to your incisions including antibiotics (I e Neosporin)  Do not use peroxide to clean your wound   Do not soak your hands in standing water (dishwater, tubs, Jacuzzi's, pools, etc ) until given permission (typically 2-3 weeks after injury)    Call the office if you notice any:  Increased numbness or tingling of your hand or fingers that is not relieved with elevation  Increasing pain that is not controlled with medication  Difficulty chewing, breathing, swallowing  Chest pains or shortness of breath  Fever over 101 4 degrees  Bandage: Do NOT remove bandage until follow-up appointment  Motion: Move fingers into a fist 5 times a day, DO NOT move any splinted fingers  Weight bearing status: The operated extremity should be non-weight bearing until further notice  Ice: Ice for 10 minutes every hour as needed for swelling x 24 hours  Sling: Sling for comfort for 2-3 days  Pain medication:   Percocet 1 tab every 6 hours AS NEEDED for pain     Therapy: No therapy needed at this time  Follow-up Appointment: 10-14 days  Please call the office if you have any questions or concerns regarding your post-operative care

## 2023-02-22 NOTE — INTERVAL H&P NOTE
H&P reviewed  After examining the patient I find no changes in the patients condition since the H&P had been written      Vitals:    02/22/23 0700   BP: 131/66   Pulse: 71   Resp: 14   Temp: (!) 97 2 °F (36 2 °C)   SpO2: 100%

## 2023-02-22 NOTE — ANESTHESIA PREPROCEDURE EVALUATION
Procedure:  OPEN REDUCTION W/ INTERNAL FIXATION (ORIF) RADIUS (Left: Wrist)    Relevant Problems   CARDIO   (+) Other hyperlipidemia        Physical Exam    Airway    Mallampati score: I  TM Distance: >3 FB  Neck ROM: full     Dental   No notable dental hx     Cardiovascular  Cardiovascular exam normal    Pulmonary  Pulmonary exam normal     Other Findings        Anesthesia Plan  ASA Score- 2     Anesthesia Type- regional with ASA Monitors  Additional Monitors:   Airway Plan:           Plan Factors-Exercise tolerance (METS): >4 METS  Chart reviewed  EKG reviewed  Existing labs reviewed  Patient summary reviewed  Patient is not a current smoker  Induction- intravenous  Postoperative Plan- Plan for postoperative opioid use  Informed Consent- Anesthetic plan and risks discussed with patient  I personally reviewed this patient with the CRNA  Discussed and agreed on the Anesthesia Plan with the CRNA  Caro Correia

## 2023-02-22 NOTE — OP NOTE
OPERATIVE REPORT  PATIENT NAME: Manuela Hendricks    :  1965  MRN: [id number]  Pt Location: [facility] OR [facility]OM 04    SURGERY DATE: 2023    Surgeon(s) and Role:     * Lay Perdomo MD - Primary     * Jose Carlos Dawn, [address]     Physician Assistant need: Physician assistant was needed to assist with dissection, retraction, reduction, and closure  No qualified resident was available  Preop Diagnosis:  Closed fracture distal radius, intra-articular greater than 3 parts    Post-Op Diagnosis Codes:  Closed fracture distal radius, intra-articular greater than 3 parts    Procedure(s):  Left - OPEN REDUCTION W/ INTERNAL FIXATION (ORIF) RADIUS, INTRA-ARTICULAR GREATER THAN 3 PARTS    Specimen(s):  * No specimens in log *    Estimated Blood Loss:   Minimal    Drains:  * No LDAs found *    Anesthesia Type:   Regional with Sedation    Operative Indications:  Patient is 61year old female that fell and sustained a left distal radius fracture   We evaluated patient in the outpatient setting and we discussed treatment options including closed management and operative fixation  We reviewed imaging and discussed intra-articular fracture, and volar translation of distal fracture fragment  Given radiographic findings, we offered operative management   We discussed risks of operative management including pain, bleeding, stiffness, infection, need for further surgeries   Patient elected to proceed with operative management   Informed consent was obtained      Operative Findings:  Intra-articular distal radius fracture that was able to be open reduced and internally fixated         Complications:   None     Procedure and Technique:  Patient was identified in the preoperative holding area   Surgical site was marked with patient participation  Elinore Syed block was performed by our anesthesia colleagues  Maddy Phillips was taken back to the operating theater  Elinore Syed was induced  Jalen Banegas was prepped and draped in typical fashion   Formal time-out was performed confirming site, patient, and procedure   All present were in agreement   Tourniquet was inflated to 250 mm of mercury on the arm   Standard FCR approach was performed  Care was taken to protect the radial artery and median nerve and its branches  An Acumed standard width plate was selected   This plate was applied and positioned using two 0 054 k-wires under fluoroscopy  Three shaft screws were filled with bicortical screws  These shaft screws reduced volar fragments well  Attention was turned to the distal holes  With the wrist in flexion, there seemed to be some intra-articular reduction of scaphoid facet  This maneuver helped reduce the fragment some, however, there was still noted to be some intra-articular step-off that was not able to improved with different maneuvers including traction and radial deviation, so this step off was accepted  The two distal and radial holes were drilled and filled in unicortical locking fashion with wrist in flexion  Reduction was reviewed fluoroscopically and deemed reasonable  The remaining distal holes were drilled and filled in unicortical locking fashion with wrist in neutral  Final radiographs were performed   Flexion extension radiographs were performed confirming no motion at fracture site       Tourniquet was deflated   Hemostasis was achieved with bipolar electrocautery  4-0 vicryl closed the deep dermal layer of the distal radius incision   Running 4-0 Monocryl closed distal radius skin  Wound was covered with Xeroform, 4x4s, volar slab wrist splint, and Ace bandage    I was present for the entire procedure    Implant Name Type Inv   Item Serial No   Lot No  LRB No  Used Action   LOG [id number] - TRIMED WRIST FIXATION SYSTEM - 1           LOG [id number] - [employer] 2 4MM VA-LCP DISTAL RADIUS SYSTEM - 1           LOG [id number] - SYNTHES LCP MODULAR MINI FRAGMET SET - 1           PLATE VDR LCK BVL EDGE STD  LT - [id number] PLATE VDR LCK BVL EDGE STD  LT  [employer]  Left 1 Implanted   WIRE  0 054 X 6IN - [id number]  WIRE  0 054 X 6IN  [employer]  Left 3 Implanted and Explanted   SCREW NON-LCK  3 5 X 12MM HEXALOBE - [id number]  SCREW NON-LCK  3 5 X 12MM HEXALOBE  [employer]  Left 3 Implanted   SCREW RANDY LCK 2 3 X 20MM - [id number]  SCREW RANDY LCK 2 3 X 20MM  [employer]  Left 1 Implanted   SCREW RANDY LCK 2 3 X 18MM - [id number]  SCREW RANDY LCK 2 3 X 18MM  [employer]  Left 4 Implanted   SCREW RANDY LCK 2 3 X 16MM - [id number]  SCREW RANDY LCK 2 3 X 16MM  [employer]  Left 2 Implanted       Patient Disposition:  PACU         SIGNATURE: Ludmila Walls MD  DATE: February 22, 2023  TIME: 9:53 AM

## 2023-02-22 NOTE — ANESTHESIA POSTPROCEDURE EVALUATION
Post-Op Assessment Note    CV Status:  Stable  Pain Score: 0    Pain management: adequate     Mental Status:  Alert and awake   Hydration Status:  Euvolemic   PONV Controlled:  Controlled   Airway Patency:  Patent      Post Op Vitals Reviewed: Yes      Staff: Anesthesiologist, CRNA         There were no known notable events for this encounter      BP   104/66   Temp  97 6   Pulse  85   Resp   18   SpO2   95

## 2023-02-22 NOTE — ANESTHESIA PROCEDURE NOTES
Peripheral Block    Patient location during procedure: pre-op  Start time: 2/22/2023 7:10 AM  Reason for block: at surgeon's request and post-op pain management  Staffing  Performed: Anesthesiologist   Anesthesiologist: Bree Rodriguez MD  Preanesthetic Checklist  Completed: patient identified, IV checked, site marked, risks and benefits discussed, surgical consent, monitors and equipment checked, pre-op evaluation and timeout performed  Peripheral Block  Patient position: supine  Prep: ChloraPrep  Patient monitoring: heart rate, continuous pulse ox and frequent blood pressure checks  Block type: supraclavicular  Laterality: left  Injection technique: single-shot  Procedures: ultrasound guided, Ultrasound guidance required for the procedure to increase accuracy and safety of medication placement and decrease risk of complications    Ultrasound permanent image savedropivacaine (NAROPIN) 0 5 % - Perineural   25 mL - 2/22/2023 7:10:00 AM  Needle  Needle type: Stimuplex   Needle gauge: 22 G  Needle length: 4 in  Needle localization: ultrasound guidance  Assessment  Injection assessment: incremental injection, local visualized surrounding nerve on ultrasound, negative aspiration for heme and no paresthesia on injection  Paresthesia pain: none  Heart rate change: no  Slow fractionated injection: yes  Post-procedure:  site cleaned  patient tolerated the procedure well with no immediate complications

## 2023-03-02 ENCOUNTER — TELEPHONE (OUTPATIENT)
Dept: SURGICAL ONCOLOGY | Facility: CLINIC | Age: 58
End: 2023-03-02

## 2023-03-02 ENCOUNTER — TELEPHONE (OUTPATIENT)
Dept: HEMATOLOGY ONCOLOGY | Facility: CLINIC | Age: 58
End: 2023-03-02

## 2023-03-02 NOTE — TELEPHONE ENCOUNTER
Called and left message for patient to reschedule appointment with [facility] that she cancelled via her MyChart  Left call back number  Offered sooner appointments with one of the nurse practitioners

## 2023-03-02 NOTE — TELEPHONE ENCOUNTER
Scheduling Appointment SEND TO [facility]    Person calling in Patient     If other than patient calling, are they listed on the communication consent form? N/A   Doctor Angie Guillory   Location [facility]   Appointment date and time 4/26/23 9:00AM   Reason for scheduling appointment Follow up   Patient verbalized understanding? Yes   No show policy reviewed with patient Yes     Patient states her mammogram was pushed back to 04/19/23 and that is why she cancelled her appointment via [address]   Patient wanted to wait to schedule her follow up until a week after her mammogram

## 2023-03-06 ENCOUNTER — OFFICE VISIT (OUTPATIENT)
Dept: OBGYN CLINIC | Facility: CLINIC | Age: 58
End: 2023-03-06

## 2023-03-06 ENCOUNTER — HOSPITAL ENCOUNTER (OUTPATIENT)
Dept: RADIOLOGY | Facility: HOSPITAL | Age: 58
Discharge: HOME | End: 2023-03-06
Attending: STUDENT IN AN ORGANIZED HEALTH CARE EDUCATION/TRAINING PROGRAM

## 2023-03-06 VITALS
HEART RATE: 63 BPM | SYSTOLIC BLOOD PRESSURE: 169 MMHG | WEIGHT: 254.2 LBS | DIASTOLIC BLOOD PRESSURE: 89 MMHG | HEIGHT: 69 IN | BODY MASS INDEX: 37.65 KG/M2

## 2023-03-06 DIAGNOSIS — S52.502A CLOSED FRACTURE DISTAL RADIUS AND ULNA, LEFT, INITIAL ENCOUNTER: ICD-10-CM

## 2023-03-06 DIAGNOSIS — S52.602A CLOSED FRACTURE DISTAL RADIUS AND ULNA, LEFT, INITIAL ENCOUNTER: ICD-10-CM

## 2023-03-06 DIAGNOSIS — S52.502A CLOSED FRACTURE DISTAL RADIUS AND ULNA, LEFT, INITIAL ENCOUNTER: Primary | ICD-10-CM

## 2023-03-06 DIAGNOSIS — S52.602A CLOSED FRACTURE DISTAL RADIUS AND ULNA, LEFT, INITIAL ENCOUNTER: Primary | ICD-10-CM

## 2023-03-06 DIAGNOSIS — Z98.890 S/P ORIF (OPEN REDUCTION INTERNAL FIXATION) FRACTURE: ICD-10-CM

## 2023-03-06 DIAGNOSIS — Z87.81 S/P ORIF (OPEN REDUCTION INTERNAL FIXATION) FRACTURE: ICD-10-CM

## 2023-03-06 NOTE — PROGRESS NOTES
Assessment/Plan:  Patient ID: Anni Rose 61 y o  female   Surgery: Open Reduction W/ Internal Fixation (orif) Radius - Left  Date of Surgery: 2/22/2023    Xrays reviewed  Pt overall doing well  At this time, will provide a cock up wrist splint to use other than hygiene and therapy  Therapy to work on ROM  No strengthening yet  Follow Up:  4 weeks    To Do Next Visit:  X-rays of the  left  wrist      CHIEF COMPLAINT:  Chief Complaint   Patient presents with   • Left Wrist - Post-op         SUBJECTIVE:  Anni Rose is a 61y o  year old female who presents for follow up after Open Reduction W/ Internal Fixation (orif) Radius - Left  Today patient states she has some soreness, but nothing out of control  She hasn't had to take narcotics  She describes slight tingling along the midportion of her thumb only, no numbness  PHYSICAL EXAMINATION:  General: well developed and well nourished, alert, oriented times 3 and appears comfortable  Psychiatric: Normal    MUSCULOSKELETAL EXAMINATION:  Incision: Clean, dry, intact  Surgery Site: normal, no evidence of infection   Range of Motion: As expected and Limited due to stiffness  Neurovascular status: Neuro intact throughout (pt describes "fuzziness" to palpation of ulnar thumb, mid portion only  Normal sensation to thumb tip), good cap refill         STUDIES REVIEWED:  Xrays of the left distal radius were reviewed in PACS by Dr Kayla Loera and demonstrate maintained alignment of distal radius fx s/p ORIF  No hardware malfunction         PROCEDURES PERFORMED:  Procedures  No Procedures performed today    Scribe Attestation    I,:  Jaycee Art PA-C am acting as a scribe while in the presence of the attending physician :       I,:  Lee Ann Mclaughlin MD personally performed the services described in this documentation    as scribed in my presence :

## 2023-03-06 NOTE — LETTER
March 6, 2023     Patient: Angel Garcia  YOB: 1965  Date of Visit: 3/6/2023      To Whom it May Concern:    Marisa Pelaez is under my professional care  Luis Eduardo Miles was seen in my office on 3/6/2023  Luis Eduardo Miles may return to work with limitations - desk work only  No lifting > 1lb with the left hand  If you have any questions or concerns, please don't hesitate to call           Sincerely,          Randy Okeefe MD        CC: No Recipients

## 2023-03-08 ENCOUNTER — VBI (OUTPATIENT)
Dept: ADMINISTRATIVE | Facility: OTHER | Age: 58
End: 2023-03-08

## 2023-03-13 NOTE — PROGRESS NOTES
OT Re-Evaluation     Today's date: 3/13/2023  Patient name: Vanessa Ca  : 1965  MRN: [id number]  Referring provider: Jennifer Rea PA-C  Dx:   Encounter Diagnosis     ICD-10-CM    1  Closed fracture distal radius and ulna, left, initial encounter  S52 502A     S52 602A       2  S/P ORIF (open reduction internal fixation) fracture  Z98 890     Z87 81                      Assessment  Assessment details: Zach Jean is s/p L distal radius closed fx ORIF ; she has swelling, N/T along her median nerve distribution, pain and fx precautions affecting her engagement with ADLs, IADLs and work tasks  Goals  STG) Motion increased by 25% in 4-6 weeks to facilitate ability to position garmets to dress herself   STG) Strength/Motor skills improved by 25% in 4-6 weeks to facilitate engagement in cleaning activities  STG) SwellingEdema improved by 25% in 4-6 weeks  to facilitate typing skills   STG) Pain decreased by 25% in 4-6 weeks to facilitate eating with utensils    LTG) ADL and IADL skills improved   LTG) Work skills improved  LTG) Leisure skills improved    Patient Goals: To get more function with her hand, have less pain      Goals, plan of care and treatment condition discussed with patient  Patient expresses their understanding and questions regarding these issues were addressed        Plan  Patient would benefit from: OT eval and custom splinting  Planned modality interventions: TENS, thermotherapy: hydrocollator packs, thermotherapy: paraffin bath and ultrasound  Planned therapy interventions: neuromuscular re-education, motor coordination training, manual therapy, joint mobilization, Byrne taping, strengthening, stretching, therapeutic activities, therapeutic exercise, functional ROM exercises, fine motor coordination training and orthotic fitting/training  Frequency: 2x week  Duration in visits: 10  Treatment plan discussed with: patient        Subjective Evaluation    History of Present Illness  Mechanism of injury: L distal radial fx with ORIF 23, ([address] 2/15), 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    Pain  Current pain rating: 3  At worst pain ratin    Hand dominance: right          Objective     Active Range of Motion     Left Elbow   Forearm supination: 40 degrees   Forearm pronation: 65 degrees     Left Wrist   Wrist flexion: 20 degrees   Wrist extension: 30 degrees   Radial deviation: 5 degrees   Ulnar deviation: 15 degrees     Left Thumb   Flexion     MP: 45 degrees    DIP: 50 degrees  Palmar Abduction     CMC: 55 degrees  Radial abduction    CMC: 45 degrees    Left Digits    Flexion   Index     MCP: 60    PIP: 82    DIP: 60  Middle     MCP: 60    PIP: 90    DIP: 36  Ring     MCP: 62    PIP: 82    DIP: 42  Little     MCP: 60    PIP: 70    DIP: 40  Extension   Ring     DIP: -18  Little     DIP: -8    Tests     Additional Tests Details   2 83 all digits L  - negative carpal tunnel compression    Swelling     Left Wrist/Hand   Circumference MCP: 20 4 cm  Circumference wrist: 20 3 cm             Precautions: Fx  HEP: Scar mob, Wrist AROM/AAROM TGE, blocking      Manuals             IASTM             KT                                       Neuro Re-Ed             Wrist maze             Wall walking             translation                                                                 Ther Ex                                                                                                                     Ther Activity                                       Gait Training                                       Modalities

## 2023-03-14 ENCOUNTER — EVALUATION (OUTPATIENT)
Dept: OCCUPATIONAL THERAPY | Age: 58
End: 2023-03-14

## 2023-03-14 DIAGNOSIS — Z98.890 S/P ORIF (OPEN REDUCTION INTERNAL FIXATION) FRACTURE: ICD-10-CM

## 2023-03-14 DIAGNOSIS — S52.502A CLOSED FRACTURE DISTAL RADIUS AND ULNA, LEFT, INITIAL ENCOUNTER: Primary | ICD-10-CM

## 2023-03-14 DIAGNOSIS — S52.602A CLOSED FRACTURE DISTAL RADIUS AND ULNA, LEFT, INITIAL ENCOUNTER: Primary | ICD-10-CM

## 2023-03-14 DIAGNOSIS — Z87.81 S/P ORIF (OPEN REDUCTION INTERNAL FIXATION) FRACTURE: ICD-10-CM

## 2023-03-21 ENCOUNTER — OFFICE VISIT (OUTPATIENT)
Dept: OCCUPATIONAL THERAPY | Age: 58
End: 2023-03-21

## 2023-03-21 DIAGNOSIS — Z98.890 S/P ORIF (OPEN REDUCTION INTERNAL FIXATION) FRACTURE: ICD-10-CM

## 2023-03-21 DIAGNOSIS — S52.602A CLOSED FRACTURE DISTAL RADIUS AND ULNA, LEFT, INITIAL ENCOUNTER: Primary | ICD-10-CM

## 2023-03-21 DIAGNOSIS — Z87.81 S/P ORIF (OPEN REDUCTION INTERNAL FIXATION) FRACTURE: ICD-10-CM

## 2023-03-21 DIAGNOSIS — S52.502A CLOSED FRACTURE DISTAL RADIUS AND ULNA, LEFT, INITIAL ENCOUNTER: Primary | ICD-10-CM

## 2023-03-21 NOTE — PROGRESS NOTES
Daily Note     Today's date: 3/21/2023  Patient name: Elvi Rees  : 1965  MRN: [id number]  Referring provider: Jessy Sierra PA-C  Dx:   Encounter Diagnosis     ICD-10-CM    1  Closed fracture distal radius and ulna, left, initial encounter  S52 502A     S52 602A       2  S/P ORIF (open reduction internal fixation) fracture  Z98 890     Z87 81                      Subjective: "It's pretty sore"      Objective: See treatment diary below      Assessment: 6/10 by end of session and fatigued  AROM and swelling both grossly improving  Plan: Continue per plan of care        Precautions: Fx  HEP: Scar mob, Wrist AROM/AAROM TGE, blocking      Manuals 3/21            IASTM 10            KT                                       Neuro Re-Ed             Wrist maze 10            Wall walking 10x tb (held, fatigue)            translation coins                                                                Ther Ex             AAROM 10            S/P Dowel 3x 30s            EDC 20x lite                                                                             Ther Activity                                                                              Modalities             MHP 5

## 2023-03-23 ENCOUNTER — OFFICE VISIT (OUTPATIENT)
Dept: OCCUPATIONAL THERAPY | Age: 58
End: 2023-03-23

## 2023-03-23 DIAGNOSIS — Z98.890 S/P ORIF (OPEN REDUCTION INTERNAL FIXATION) FRACTURE: ICD-10-CM

## 2023-03-23 DIAGNOSIS — S52.602A CLOSED FRACTURE DISTAL RADIUS AND ULNA, LEFT, INITIAL ENCOUNTER: Primary | ICD-10-CM

## 2023-03-23 DIAGNOSIS — S52.502A CLOSED FRACTURE DISTAL RADIUS AND ULNA, LEFT, INITIAL ENCOUNTER: Primary | ICD-10-CM

## 2023-03-23 DIAGNOSIS — Z87.81 S/P ORIF (OPEN REDUCTION INTERNAL FIXATION) FRACTURE: ICD-10-CM

## 2023-03-23 NOTE — PROGRESS NOTES
Daily Note     Today's date: 3/23/2023  Patient name: Gopi Isaacs  : 1965  MRN: [id number]  Referring provider: Ann Gallagher PA-C  Dx:   Encounter Diagnosis     ICD-10-CM    1  Closed fracture distal radius and ulna, left, initial encounter  S52 502A     S52 602A       2  S/P ORIF (open reduction internal fixation) fracture  Z98 890     Z87 81                      Subjective: "I was sore"      Objective: See treatment diary below      Assessment: Still co palm tingling; better endurance and pain today    Plan: Continue per plan of care        Precautions: Fx  HEP: Scar mob, Wrist AROM/AAROM TGE, blocking      Manuals 3/21 3/23           IASTM 10 10           KT                                       Neuro Re-Ed             Wrist maze 10 10           Wall walking 10x tb (held, fatigue) 0           translation coins coins                                                               Ther Ex             AAROM 10 10           S/P Dowel 3x 30s jo-ann 3x 30s           EDC 20x lite 30x lite                                                                            Ther Activity                                                                              Modalities             MHP 5

## 2023-03-28 ENCOUNTER — OFFICE VISIT (OUTPATIENT)
Dept: OCCUPATIONAL THERAPY | Age: 58
End: 2023-03-28

## 2023-03-28 DIAGNOSIS — Z98.890 S/P ORIF (OPEN REDUCTION INTERNAL FIXATION) FRACTURE: ICD-10-CM

## 2023-03-28 DIAGNOSIS — S52.602A CLOSED FRACTURE DISTAL RADIUS AND ULNA, LEFT, INITIAL ENCOUNTER: Primary | ICD-10-CM

## 2023-03-28 DIAGNOSIS — Z87.81 S/P ORIF (OPEN REDUCTION INTERNAL FIXATION) FRACTURE: ICD-10-CM

## 2023-03-28 DIAGNOSIS — S52.502A CLOSED FRACTURE DISTAL RADIUS AND ULNA, LEFT, INITIAL ENCOUNTER: Primary | ICD-10-CM

## 2023-03-28 NOTE — PROGRESS NOTES
"Daily Note     Today's date: 3/28/2023  Patient name: Sirena Brooks  : 1965  MRN: [id number]  Referring provider: Osmany Toledo PA-C  Dx:   Encounter Diagnosis     ICD-10-CM    1  Closed fracture distal radius and ulna, left, initial encounter  S52 502A     S52 602A       2  S/P ORIF (open reduction internal fixation) fracture  Z98 890     Z87 81                      Subjective: \"I still have a little n/t but it's better\"      Objective: See treatment diary below      Assessment: Tolerated treatment well, still limited in AROM of wrist; better endurance and no c/o pain today    Plan: Continue per plan of care        Precautions: Fx  HEP: Scar mob, Wrist AROM/AAROM TGE, blocking      Manuals 3/21 3/23 3/28          IASTM 10 10 8'          KT                                       Neuro Re-Ed             Wrist maze 10 10 10x          Wall walking 10x tb (held, fatigue) 0           translation coins coins Coins- pro/sup                                                              Ther Ex             AAROM 10 10 Ball rolls 10x 8 sec holds, black tiwst bar we/wf 10x 8 sec          S/P Dowel 3x 30s jo-ann 3x 30s dowel 10x 10 sec          EDC 20x lite 30x lite                                                                            Ther Activity                                                                              Modalities             MHP 5  5'                            "

## 2023-03-30 ENCOUNTER — OFFICE VISIT (OUTPATIENT)
Dept: OCCUPATIONAL THERAPY | Age: 58
End: 2023-03-30

## 2023-03-30 DIAGNOSIS — Z98.890 S/P ORIF (OPEN REDUCTION INTERNAL FIXATION) FRACTURE: ICD-10-CM

## 2023-03-30 DIAGNOSIS — Z87.81 S/P ORIF (OPEN REDUCTION INTERNAL FIXATION) FRACTURE: ICD-10-CM

## 2023-03-30 DIAGNOSIS — S52.502A CLOSED FRACTURE DISTAL RADIUS AND ULNA, LEFT, INITIAL ENCOUNTER: Primary | ICD-10-CM

## 2023-03-30 DIAGNOSIS — S52.602A CLOSED FRACTURE DISTAL RADIUS AND ULNA, LEFT, INITIAL ENCOUNTER: Primary | ICD-10-CM

## 2023-03-30 NOTE — PROGRESS NOTES
"Daily Note     Today's date: 3/30/2023  Patient name: Valorie Garcia  : 1965  MRN: [id number]  Referring provider: Karen Quach PA-C  Dx:   Encounter Diagnosis     ICD-10-CM    1  Closed fracture distal radius and ulna, left, initial encounter  S52 502A     S52 602A       2  S/P ORIF (open reduction internal fixation) fracture  Z98 890     Z87 81                      Subjective: \"It's \"      Objective: See treatment diary below      Assessment: Increased tolerance; overall making steady AROM gains    Plan: Continue per plan of care        Precautions: Fx  HEP: Scar mob, Wrist AROM/AAROM TGE, blocking      Manuals 3/21 3/23 3/28 3/30         IASTM 10 10 8' 10         KT                                       Neuro Re-Ed             Wrist maze 10 10 10x 10x         Wall walking 10x tb (held, fatigue) 0           translation coins coins Coins- pro/sup coins                                                             Ther Ex             AAROM 10 10 Ball rolls 10x 8 sec holds, black tiwst bar we/wf 10x 8 sec 10         S/P Dowel 3x 30s jo-ann 3x 30s dowel 10x 10 sec BPB (dowel) 3x 30s         EDC 20x lite 30x lite  30x edc light rb                                                                          Ther Activity                                                                              Modalities             RUST 5  5' 5                           "

## 2023-04-03 ENCOUNTER — HOSPITAL ENCOUNTER (OUTPATIENT)
Dept: RADIOLOGY | Facility: HOSPITAL | Age: 58
Discharge: HOME | End: 2023-04-03
Attending: STUDENT IN AN ORGANIZED HEALTH CARE EDUCATION/TRAINING PROGRAM

## 2023-04-03 ENCOUNTER — OFFICE VISIT (OUTPATIENT)
Dept: OCCUPATIONAL THERAPY | Age: 58
End: 2023-04-03

## 2023-04-03 ENCOUNTER — OFFICE VISIT (OUTPATIENT)
Dept: OBGYN CLINIC | Facility: CLINIC | Age: 58
End: 2023-04-03

## 2023-04-03 VITALS
DIASTOLIC BLOOD PRESSURE: 86 MMHG | HEIGHT: 69 IN | BODY MASS INDEX: 37 KG/M2 | SYSTOLIC BLOOD PRESSURE: 157 MMHG | HEART RATE: 71 BPM | WEIGHT: 249.8 LBS

## 2023-04-03 DIAGNOSIS — S52.502D CLOSED FRACTURE OF LEFT DISTAL RADIUS AND ULNA, WITH ROUTINE HEALING, SUBSEQUENT ENCOUNTER: Primary | ICD-10-CM

## 2023-04-03 DIAGNOSIS — S52.602A CLOSED FRACTURE DISTAL RADIUS AND ULNA, LEFT, INITIAL ENCOUNTER: Primary | ICD-10-CM

## 2023-04-03 DIAGNOSIS — S52.502A CLOSED FRACTURE DISTAL RADIUS AND ULNA, LEFT, INITIAL ENCOUNTER: ICD-10-CM

## 2023-04-03 DIAGNOSIS — S52.602D CLOSED FRACTURE OF LEFT DISTAL RADIUS AND ULNA, WITH ROUTINE HEALING, SUBSEQUENT ENCOUNTER: Primary | ICD-10-CM

## 2023-04-03 DIAGNOSIS — S52.502A CLOSED FRACTURE DISTAL RADIUS AND ULNA, LEFT, INITIAL ENCOUNTER: Primary | ICD-10-CM

## 2023-04-03 DIAGNOSIS — S52.602A CLOSED FRACTURE DISTAL RADIUS AND ULNA, LEFT, INITIAL ENCOUNTER: ICD-10-CM

## 2023-04-03 NOTE — PROGRESS NOTES
Assessment/Plan:  Patient ID: Clarice Gomez 61 y o  female   Surgery: Open Reduction W/ Internal Fixation (orif) Radius - Left  Date of Surgery: 2/22/2023    Xrays reviewed  Pt can d/c use of brace  Can begin strengthening with therapy  Discussed more aggressive therapy to work on ROM  5lb weight restriction for work  Follow Up:  6 weeks    To Do Next Visit:  X-rays of the  left  wrist to include 20 degree elevated view      CHIEF COMPLAINT:  Chief Complaint   Patient presents with   • Left Wrist - Post-op         SUBJECTIVE:  Clarice Gomez is a 61y o  year old female who presents for follow up after Open Reduction W/ Internal Fixation (orif) Radius - Left  Today patient states she is doing well  Feels like she is making progress with therapy  Goes to therapy 2x/wk and does HEP  PHYSICAL EXAMINATION:  General: well developed and well nourished, alert, oriented times 3 and appears comfortable  Psychiatric: Normal    MUSCULOSKELETAL EXAMINATION:  Incision: Clean, dry, intact  Surgery Site: normal, no evidence of infection   Range of Motion: flexion/extension 30/30, supination/pronation 70/70  Neurovascular status: Neuro intact, good cap refill         STUDIES REVIEWED:  Xrays of the left wrist were reviewed in PACS by Dr Kim Bills and demonstrate well-aligned and healing fracture s/p ORIF          PROCEDURES PERFORMED:  Procedures  No Procedures performed today    Scribe Attestation    I,:  Stephanie Yan PA-C am acting as a scribe while in the presence of the attending physician :       I,:  Marisa Bowling MD personally performed the services described in this documentation    as scribed in my presence :

## 2023-04-03 NOTE — LETTER
April 3, 2023     Patient: Clarice Gomez  YOB: 1965  Date of Visit: 4/3/2023      To Whom it May Concern:    Jose J Dumas is under my professional care  Ruth Barragan was seen in my office on 4/3/2023  Ruth Barragan can return to driving  Can return to work with the following restriction: no lifting > 5lbs  If you have any questions or concerns, please don't hesitate to call           Sincerely,          Marisa Bowling MD        CC: No Recipients

## 2023-04-03 NOTE — PROGRESS NOTES
"Daily Note     Today's date: 4/3/2023  Patient name: Franko Shaw  : 1965  MRN: [id number]  Referring provider: Ellie Bailey PA-C  Dx:   Encounter Diagnosis     ICD-10-CM    1  Closed fracture of left distal radius and ulna, with routine healing, subsequent encounter  S52 502D     S52 602D           Start Time: 0800  Stop Time: 0840  Total time in clinic (min): 40 minutes    Subjective: \"\"It's stiff today  I was sick yesterday and didn't do my exercises and I can tell  \" Pt is 5 weeks 5 days post op  Objective: See treatment diary below    AROM (PROM) degrees    Elbow/Forearm   Left   Supination 75   Pronation 75     Wrist   Left   Extension 35   Flexion 45   Radial Dev  14   Ulnar Dev  28       Assessment: Pt is doing fairly well overall  She is almost 6 weeks post op, using L UE for light daily activities as tolerated  Avoiding heavier manual use  She has a follow up with provider later today  Pt reported improved AROM post session today  Plan: Continue per plan of care        Precautions: Fx  HEP: Scar mob, Wrist AROM/AAROM TGE, blocking      Manuals 3/21 3/23 3/28 3/30 4/3        IASTM 10 10 8' 10 10'        KT                                       Neuro Re-Ed             Wrist maze 10 10 10x 10x 10x        Wall walking 10x tb (held, fatigue) 0           translation coins coins Coins- pro/sup coins                                                             Ther Ex             AAROM 10 10 Ball rolls 10x 8 sec holds, black tiwst bar we/wf 10x 8 sec 10 AA/PROm    10x 8s  ball roll into flexion/extension on table        S/P Dowel 3x 30s jo-ann 3x 30s dowel 10x 10 sec BPB (dowel) 3x 30s BPB (dowel)  3x30s        EDC 20x lite 30x lite  30x edc light rb 30x edc heavier rb                                                                         Ther Activity                                                                              Modalities             MHP 5  5' 5 5                   " Sensation and skin integrity assessed prior to, during, and following the application of moist heat pack, instrument assisted soft-tissue mobilization  All assessments found sensation and integrity to be intact  Pt instructed to inform clinician if use of the modality became uncomfortable and/or too intense to tolerate at any time

## 2023-04-25 ENCOUNTER — APPOINTMENT (OUTPATIENT)
Dept: OCCUPATIONAL THERAPY | Age: 58
End: 2023-04-25

## 2023-04-26 ENCOUNTER — OFFICE VISIT (OUTPATIENT)
Dept: SURGICAL ONCOLOGY | Facility: CLINIC | Age: 58
End: 2023-04-26

## 2023-04-26 ENCOUNTER — TELEPHONE (OUTPATIENT)
Dept: HEMATOLOGY ONCOLOGY | Facility: CLINIC | Age: 58
End: 2023-04-26

## 2023-04-26 VITALS
HEIGHT: 69 IN | DIASTOLIC BLOOD PRESSURE: 80 MMHG | SYSTOLIC BLOOD PRESSURE: 140 MMHG | RESPIRATION RATE: 16 BRPM | HEART RATE: 66 BPM | BODY MASS INDEX: 37.03 KG/M2 | TEMPERATURE: 97.8 F | WEIGHT: 250 LBS | OXYGEN SATURATION: 97 %

## 2023-04-26 DIAGNOSIS — R92.2 DENSE BREASTS: Primary | ICD-10-CM

## 2023-04-26 DIAGNOSIS — Z12.39 BREAST CANCER SCREENING OTHER THAN MAMMOGRAM: ICD-10-CM

## 2023-04-26 DIAGNOSIS — N60.11 BILATERAL FIBROCYSTIC BREAST CHANGES: ICD-10-CM

## 2023-04-26 DIAGNOSIS — Z80.3 FAMILY HISTORY OF BREAST CANCER IN FEMALE: ICD-10-CM

## 2023-04-26 DIAGNOSIS — N60.12 BILATERAL FIBROCYSTIC BREAST CHANGES: ICD-10-CM

## 2023-04-26 NOTE — TELEPHONE ENCOUNTER
I called Wendell Seip to schedule a new patient appointment with the Cancer Risk and Genetics Program       Outcome:  Genetics appointment scheduled for 09/21/23 at 11:30AM with Eb Farah    Personal/Family History Related to Appointment:  No personal history of cancer  Family history of breast cancer (mother and sister), cancer metastatic to brain (father), unknown cancer (paternal uncle)  Patient is Non-Yazidism    History of Genetic Testing:  Patient reports no personal or family history of genetic testing    Genetics Family History Questionnaire:  I confirmed the patient's e-mail on file as the best e-mail to send an invite link for our genetics family history intake     Email: [email or web address]

## 2023-04-26 NOTE — PROGRESS NOTES
Surgical Oncology Follow Up       [address] [facility]  [facility] [place]  [address] [facility] [address]  [place] [address]    Lesley Grigsby  1965  [id number]  [address] [facility]  [facility] [place]  [address] [facility] [address]    Diagnoses and all orders for this visit:    Dense breasts  -     US breast screening bilateral complete (ABUS); Future    Family history of breast cancer in female  -     US breast screening bilateral complete (ABUS); Future  -     Ambulatory Referral to Oncology Genetics; Future    Bilateral fibrocystic breast changes    Breast cancer screening other than mammogram        Chief Complaint   Patient presents with   • Follow-up       Return in about 1 year (around 4/26/2024) for Office Visit  History of Present Illness: The patient returns today for breast follow-up  She has fibrocystic breasts as well as significant family history  She denies any changes on self-exam   She denies any breast tenderness, skin changes, lumps or nodules  Screening mammogram was performed on April 19, 2023, with a BI-RADS 1 result  I have reviewed these results myself and discussed them with the patient today  Review of Systems   Constitutional: Negative for activity change, appetite change, fatigue and unexpected weight change  HENT: Negative  Respiratory: Negative  Negative for cough and shortness of breath  Cardiovascular: Negative  Gastrointestinal: Negative  Negative for abdominal distention, abdominal pain, nausea and vomiting  Musculoskeletal: Negative  Skin: Negative  Neurological: Negative  Negative for dizziness and headaches  Hematological: Negative  Negative for adenopathy  Psychiatric/Behavioral: Negative                Patient Active Problem List   Diagnosis   • Other hyperlipidemia   • Vitamin D deficiency   • Bilateral fibrocystic breast changes   • Dense breasts   • Family history of breast cancer in female   • Screening mammogram, encounter for   • Breast cancer screening other than mammogram   • Loss of height   • Family history of malignant melanoma   • Dermatitis   • Skin pruritus   • Family history of ovarian carcinoma   • Closed fracture distal radius and ulna, left, initial encounter   • S/P ORIF (open reduction internal fixation) fracture     Past Medical History:   Diagnosis Date   • Bilateral fibrocystic breast changes    • Costochondritis    • Dense breasts    • Headache    • Hemorrhoids    • Loss of height    • Sinusitis    • Urinary frequency    • Wears glasses    • Wears glasses      Past Surgical History:   Procedure Laterality Date   • COLONOSCOPY     • INCONTINENCE SURGERY     • MO COLONOSCOPY FLX DX W/COLLJ SPEC WHEN PFRMD N/A 10/4/2018    Procedure: COLONOSCOPY;  Surgeon: Idris Grace MD;  [facility];   Service: Gastroenterology   • MO OPEN TREATMENT RADIAL SHAFT FRACTURE Left 2/22/2023    Procedure: OPEN REDUCTION W/ INTERNAL FIXATION (ORIF) RADIUS;  Surgeon: Colton Geiger MD;  Location: [facility];  Service: Orthopedics   • TUBAL LIGATION     • UMBILICAL HERNIA REPAIR       Family History   Problem Relation Age of Onset   • Anemia Mother    • Breast cancer Mother         AGE DX UNK   • Coronary artery disease Father    • Melanoma Father         malignant of skin AGE DX UNK   • Leukemia Father         AGE DX UNK   • Breast cancer Sister         AGE DX UNK   • No Known Problems Daughter    • No Known Problems Maternal Grandmother    • No Known Problems Maternal Grandfather    • No Known Problems Paternal Grandmother    • No Known Problems Paternal Grandfather    • No Known Problems Sister    • No Known Problems Brother    • No Known Problems Daughter    • Ovarian cancer Maternal Aunt 79   • Melanoma Paternal Uncle    • No Known Problems Paternal Aunt    • No Known Problems Paternal Aunt      Social History     Socioeconomic History   • Marital status: /Civil Union     Spouse name: Not on file   • Number of children: 2   • Years of education: Not on file   • Highest education level: Not on file   Occupational History   • Not on file   Tobacco Use   • Smoking status: Never   • Smokeless tobacco: Never   Vaping Use   • Vaping Use: Never used   Substance and Sexual Activity   • Alcohol use: Yes     Comment: Socially Weekends   • Drug use: Never   • Sexual activity: Not on file   Other Topics Concern   • Not on file   Social History Narrative    Caffeine use    [employer]    Uses seatbelts     Social Determinants of Health     Financial Resource Strain: Not on file   Food Insecurity: Not on file   Transportation Needs: Not on file   Physical Activity: Not on file   Stress: Not on file   Social Connections: Not on file   Intimate Partner Violence: Not on file   Housing Stability: Not on file       Current Outpatient Medications:   •  Cholecalciferol (VITAMIN D3) 1000 units CAPS, Take 2,000 Units by mouth daily  , Disp: , Rfl:   •  co-enzyme Q-10 30 MG capsule, Take 30 mg by mouth 3 (three) times a day, Disp: , Rfl:   •  Garlic 2954 MG CAPS, Take 1,000 mg by mouth daily, Disp: , Rfl:   •  multivitamin (THERAGRAN) TABS, Take 1 tablet by mouth daily, Disp: , Rfl:   •  Omega-3 Fatty Acids (fish oil) 1,000 mg, Take 1,000 mg by mouth daily, Disp: , Rfl:   No Known Allergies  Vitals:    04/26/23 0901   BP: 140/80   Pulse: 66   Resp: 16   Temp: 97 8 °F (36 6 °C)   SpO2: 97%       Physical Exam  Vitals reviewed  Constitutional:       General: She is not in acute distress  Appearance: Normal appearance  She is normal weight  She is not ill-appearing or toxic-appearing  HENT:      Head: Normocephalic and atraumatic  Nose: Nose normal       Mouth/Throat:      Mouth: Mucous membranes are moist    Eyes:      General: No scleral icterus  Extraocular Movements: Extraocular movements intact        Conjunctiva/sclera: Conjunctivae normal       Pupils: Pupils are equal, round, and reactive to light  Cardiovascular:      Rate and Rhythm: Normal rate  Pulmonary:      Effort: Pulmonary effort is normal    Chest:   Breasts:     Right: Normal  No inverted nipple, mass, skin change or tenderness  Left: Normal  No inverted nipple, mass, skin change or tenderness  Comments: Breasts are smooth and even bilaterally  There are no dominant masses or nodules, skin changes or tenderness present  Musculoskeletal:         General: Normal range of motion  Cervical back: Normal range of motion and neck supple  Lymphadenopathy:      Cervical: No cervical adenopathy  Upper Body:      Right upper body: No supraclavicular, axillary or pectoral adenopathy  Left upper body: No supraclavicular, axillary or pectoral adenopathy  Skin:     General: Skin is warm and dry  Neurological:      General: No focal deficit present  Mental Status: She is alert and oriented to person, place, and time  Psychiatric:         Mood and Affect: Mood normal          Behavior: Behavior normal          Thought Content: Thought content normal          Judgment: Judgment normal            Imaging  Mammo screening bilateral w 3d & cad    Result Date: 4/20/2023  Narrative: DIAGNOSIS: Screening mammogram, encounter for TECHNIQUE: Digital screening mammography was performed  Computer Aided Detection (CAD) analyzed all applicable images  COMPARISONS: Prior breast imaging dated: 02/16/2022, 02/11/2021, 02/03/2020, 02/23/2018, 02/17/2017, 02/12/2016, 02/06/2015, and 12/18/2013 RELEVANT HISTORY: Family Breast Cancer History: History of breast cancer in Mother, Sister  Family Medical History: Family medical history includes breast cancer in 2 relatives (mother, sister) and ovarian cancer in maternal aunt  Personal History: Hormone history includes birth control  No known relevant surgical history   Medical history includes fibrocystic breast  The patient is scheduled in a reminder system for screening mammography  8-10% of cancers will be missed on mammography  Management of a palpable abnormality must be based on clinical grounds  Patients will be notified of their results via letter from our facility  Accredited by [employer] of [employer] and FDA  RISK ASSESSMENT: 5 Year Tyrer-Cuzick: 4 32 % 10 Year Tyrer-Cuzick: 9 26 % Lifetime Tyrer-Cuzick: 23 78 % TISSUE DENSITY: There are scattered areas of fibroglandular density  INDICATION: Hi Ashley is a 61 y o  female presenting for screening mammography  FINDINGS: There are no suspicious masses, grouped microcalcifications or areas of architectural distortion  The skin and nipple areolar complex are unremarkable  Impression: No mammographic evidence of malignancy  This patient has been identified as being at elevated risk for development of breast cancer based on the Tyrer-Cuzick model  She may benefit from supplemental screening  ASSESSMENT/BI-RADS CATEGORY: Left: 1 - Negative Right: 1 - Negative Overall: 1 - Negative RECOMMENDATION:      - Routine screening mammogram in 1 year for both breasts  Workstation ID: [id number]     I reviewed the above imaging data  Discussion/Summary: This is a pleasant 62 y/o female who presents today for continued breast cancer screening  There are no worrisome findings on physical exam or imaging  I will order her ABUS for later this year, and I will see her back in 1 year following repeat mammogram   Since she was unable to coordinate an appointment with Genetics last year, I will give her another referral today  She is agreeable to the plan, all questions have been answered

## 2023-04-27 ENCOUNTER — OFFICE VISIT (OUTPATIENT)
Dept: OCCUPATIONAL THERAPY | Age: 58
End: 2023-04-27

## 2023-04-27 DIAGNOSIS — Z98.890 S/P ORIF (OPEN REDUCTION INTERNAL FIXATION) FRACTURE: Primary | ICD-10-CM

## 2023-04-27 DIAGNOSIS — Z87.81 S/P ORIF (OPEN REDUCTION INTERNAL FIXATION) FRACTURE: Primary | ICD-10-CM

## 2023-04-27 DIAGNOSIS — S52.502D CLOSED FRACTURE OF LEFT DISTAL RADIUS AND ULNA, WITH ROUTINE HEALING, SUBSEQUENT ENCOUNTER: ICD-10-CM

## 2023-04-27 DIAGNOSIS — S52.602D CLOSED FRACTURE OF LEFT DISTAL RADIUS AND ULNA, WITH ROUTINE HEALING, SUBSEQUENT ENCOUNTER: ICD-10-CM

## 2023-05-03 ENCOUNTER — OFFICE VISIT (OUTPATIENT)
Dept: OCCUPATIONAL THERAPY | Age: 58
End: 2023-05-03

## 2023-05-03 DIAGNOSIS — Z98.890 S/P ORIF (OPEN REDUCTION INTERNAL FIXATION) FRACTURE: Primary | ICD-10-CM

## 2023-05-03 DIAGNOSIS — Z87.81 S/P ORIF (OPEN REDUCTION INTERNAL FIXATION) FRACTURE: Primary | ICD-10-CM

## 2023-05-04 ENCOUNTER — VBI (OUTPATIENT)
Dept: ADMINISTRATIVE | Facility: OTHER | Age: 58
End: 2023-05-04

## 2023-05-05 ENCOUNTER — OFFICE VISIT (OUTPATIENT)
Dept: OCCUPATIONAL THERAPY | Age: 58
End: 2023-05-05

## 2023-05-05 DIAGNOSIS — Z87.81 S/P ORIF (OPEN REDUCTION INTERNAL FIXATION) FRACTURE: Primary | ICD-10-CM

## 2023-05-05 DIAGNOSIS — Z98.890 S/P ORIF (OPEN REDUCTION INTERNAL FIXATION) FRACTURE: Primary | ICD-10-CM

## 2023-05-05 NOTE — PROGRESS NOTES
"Daily Note     Today's date: 2023  Patient name: Mo Cota  : 1965  MRN: [id number]  Referring provider: Young Smith PA-C  Dx:   Encounter Diagnosis     ICD-10-CM    1  S/P ORIF (open reduction internal fixation) fracture  Z98 890     Z87 81                      Subjective: \"It's not bad\"      Objective: See treatment diary below      Assessment: Tolerated treatment well without c/o pain, advancing as tolerated    Plan: Continue per plan of care        Precautions: Fx  HEP: Scar mob, Wrist AROM/AAROM TGE, blocking; GTB WE/WF EF/EE      Manuals 4/20 4/27 5/3 5/5 4/3 4/11 4/13 4/18   IASTM 10 15 10 10 10' 10 10 10   KT                                 Neuro Re-Ed           Wrist maze     10x 10x 10x    Wall walking           translation            BlaPW 3x10,G3 sustained 30s x 4 BlaPW 3x10,G4 sustained 30s x 4 BlaPW 3x10,G4 sustained 30s x 4 BlaPW 3x10,G4 sustained 30s x 4  BPW 3x10 pow/cyl BPW 3x10 pow/cyl BlaPW 3x10 pow/cyl   /pinch G3 BCP G4BCP G4BCP G4BCP  G3/GCP G3/GCP G4/BCP   WF/WE/S/P GPB 3x10 GPB 3x10 GPB 3x10 GPB 3x10    RPB 3x10 RPB 3x15 GPB 3x10              Ther Ex             AAROM PROM 10 10 PROM 10 PROM 10 PROM AA/PROm    10x 8s  ball roll into flexion/extension on table PROM 15 PROM 10 PROM 10   S/P Hammer 30s x 4    BPB (dowel)  3x30s hammer 30s x4 hammer 30s x4 hammer 30s x4   EDC yellow gummy 3x10  y gummy 3x15 y gummy 3x15 30x edc heavier rb 30x lrb rb 30x lrb rb yellow gummy 3x10   Arm Bike   6m 6m   6m 4 res    Sled push   50# 30' x 6 50# 30' x 6                                        Ther Activity                                                                  Modalities           MHP 5 5 5  5                                  "

## 2023-05-10 ENCOUNTER — OFFICE VISIT (OUTPATIENT)
Dept: OCCUPATIONAL THERAPY | Age: 58
End: 2023-05-10

## 2023-05-10 DIAGNOSIS — Z98.890 S/P ORIF (OPEN REDUCTION INTERNAL FIXATION) FRACTURE: Primary | ICD-10-CM

## 2023-05-10 DIAGNOSIS — Z87.81 S/P ORIF (OPEN REDUCTION INTERNAL FIXATION) FRACTURE: Primary | ICD-10-CM

## 2023-05-10 NOTE — PROGRESS NOTES
"Daily Note     Today's date: 5/10/2023   Patient name: Dion Maurice  : 1965  MRN: [id number]  Referring provider: Raul Roberts PA-C  Dx:   Encounter Diagnosis     ICD-10-CM    1  S/P ORIF (open reduction internal fixation) fracture  Z98 890     Z87 81                      Subjective: \"It's coming along\"      Objective: See treatment diary below      Assessment: See RE    Plan: Continue per plan of care        Precautions: Fx  HEP: Scar mob, Wrist AROM/AAROM TGE, blocking; GTB WE/WF EF/EE      Manuals 4/20 4/27 5/3 5/5 5/10 4/11 4/13 4/18   IASTM 10 15 10   10 10 10   KT                                 Neuro Re-Ed           Wrist maze      10x 10x    Wall walking           translation            BlaPW 3x10,G3 sustained 30s x 4 BlaPW 3x10,G4 sustained 30s x 4 BlaPW 3x10,G4 sustained 30s x 4 BlaPW 3x10,G4 sustained 30s x 4 BlaPW 3x10,G4 sustained 30s x 4 BPW 3x10 pow/cyl BPW 3x10 pow/cyl BlaPW 3x10 pow/cyl   /pinch G3 BCP G4BCP G4BCP G4BCP G4BCP G3/GCP G3/GCP G4/BCP   WF/WE/S/P GPB 3x10 GPB 3x10 GPB 3x10 GPB 3x10 GPB 3x10 RPB 3x10 RPB 3x15 GPB 3x10              Ther Ex             AAROM PROM 10 10 PROM 10 PROM 10 PROM  PROM 15 PROM 10 PROM 10   S/P Hammer 30s x 4     hammer 30s x4 hammer 30s x4 hammer 30s x4   EDC yellow gummy 3x10  y gummy 3x15 y gummy 3x15  30x lrb rb 30x lrb rb yellow gummy 3x10   Arm Bike   6m 6m   6m 4 res    Sled push   50# 30' x 6 50# 30' x 6                                        Ther Activity                                                                  Modalities           MHP 5 5 5  5                                    "

## 2023-05-10 NOTE — PROGRESS NOTES
OT Re-Evaluation     Today's date: 5/10/2023  Patient name: Andreina Hendricks  : 1965  MRN: [id number]  Referring provider: Kraig Desouza PA-C  Dx:   Encounter Diagnosis     ICD-10-CM    1  S/P ORIF (open reduction internal fixation) fracture  Z98 890     Z87 81                      Assessment  Assessment details: Kelsie Robbins presents with improved AROM, Pain and strength  She continues to demonstrate deficits affecting her work, ADL and IADL tasks  Goals  STG) Motion increased by 25% in 4-6 weeks to facilitate ability to position garmets to dress herself MET, EXTEND  STG) Strength/Motor skills improved by 25% in 4-6 weeks to facilitate engagement in cleaning activities MET, EXTEND  STG) Swelling\Edema improved by 25% in 4-6 weeks  to facilitate typing skills NOT MET  STG) Pain decreased by 25% in 4-6 weeks to facilitate eating with utensils PARTIALLY MET    LTG) ADL and IADL skills improved   LTG) Work skills improved  LTG) Leisure skills improved    Patient Goals: To get more function with her hand, have less pain      Goals, plan of care and treatment condition discussed with patient  Patient expresses their understanding and questions regarding these issues were addressed        Plan  Patient would benefit from: OT eval and custom splinting  Planned modality interventions: TENS, thermotherapy: hydrocollator packs, thermotherapy: paraffin bath and ultrasound  Planned therapy interventions: neuromuscular re-education, motor coordination training, manual therapy, joint mobilization, Byrne taping, strengthening, stretching, therapeutic activities, therapeutic exercise, functional ROM exercises, fine motor coordination training and orthotic fitting/training  Frequency: 2x week  Duration in visits: 10  Treatment plan discussed with: patient        Subjective Evaluation    History of Present Illness  Mechanism of injury: L distal radial fx with ORIF 23, ([address] [place] [address] [address]), 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    Pain  Current pain rating: 3  At worst pain ratin    Hand dominance: right          Objective     Active Range of Motion     Left Elbow   Forearm supination: 80 degrees   Forearm pronation: 60 degrees     Left Wrist   Wrist flexion: 35 degrees   Wrist extension: 60 degrees   Radial deviation: 15 degrees   Ulnar deviation: 30 degrees     Left Thumb   Flexion     MP: 40 degrees    DIP: 60 degrees  Palmar Abduction     CMC: 60 degrees  Radial abduction    CMC: 60 degrees    Additional Active Range of Motion Details  95% composite fist, extension    Strength/Myotome Testing     Left Wrist/Hand   Wrist extension: 5  Wrist flexion: 5  Radial deviation: 5  Ulnar deviation: 5     (2nd hand position)     Trial 1: 24 9    Right Wrist/Hand      (2nd hand position)     Trial 1: 56 1    Swelling     Left Wrist/Hand   Circumference wrist: 20 3 cm    Right Wrist/Hand   Circumference wrist: 18 6 cm             Precautions: Fx  HEP: Scar mob, Wrist AROM/AAROM TGE, blocking      Manuals             IASTM             KT                                       Neuro Re-Ed             Wrist maze             Wall walking             translation                                                                 Ther Ex                                                                                                                     Ther Activity                                       Gait Training                                       Modalities

## 2023-05-12 ENCOUNTER — OFFICE VISIT (OUTPATIENT)
Dept: OCCUPATIONAL THERAPY | Age: 58
End: 2023-05-12

## 2023-05-12 DIAGNOSIS — Z98.890 S/P ORIF (OPEN REDUCTION INTERNAL FIXATION) FRACTURE: Primary | ICD-10-CM

## 2023-05-12 DIAGNOSIS — Z87.81 S/P ORIF (OPEN REDUCTION INTERNAL FIXATION) FRACTURE: Primary | ICD-10-CM

## 2023-05-12 NOTE — PROGRESS NOTES
"Daily Note     Today's date: 2023  Patient name: Tamika Laguerre  : 1965  MRN: [id number]  Referring provider: Cathy Schlatter, PA-C  Dx:   Encounter Diagnosis     ICD-10-CM    1  S/P ORIF (open reduction internal fixation) fracture  Z98 890     Z87 81                      Subjective: \"It's OK\"      Objective: See treatment diary below      Assessment: Upgrades tolerated well    Plan: Continue per plan of care        Precautions: Fx  HEP: Scar mob, Wrist AROM/AAROM TGE, blocking; GTB WE/WF EF/EE      Manuals 4/20 4/27 5/3 5/5 5/10 5/12 4/13 4/18   IASTM 10 15 10    10 10   KT                                 Neuro Re-Ed           Wrist maze       10x    Wall walking           translation            BlaPW 3x10,G3 sustained 30s x 4 BlaPW 3x10,G4 sustained 30s x 4 BlaPW 3x10,G4 sustained 30s x 4 BlaPW 3x10,G4 sustained 30s x 4 BlaPW 3x10,G4 sustained 30s x 4 BlaP`W `3x10,G4 sustained 30s x 4 BPW 3x10 pow/cyl BlaPW 3x10 pow/cyl   /pinch G3 BCP G4BCP G4BCP G4BCP G4BCP G4BCP G3/GCP G4/BCP   WF/WE/S/P GPB 3x10 GPB 3x10 GPB 3x10 GPB 3x10 GPB 3x10 GPB 3x10 RPB 3x15 GPB 3x10              Ther Ex             AAROM PROM 10 10 PROM 10 PROM   10 PROM PROM 10 PROM 10   S/P Hammer 30s x 4      hammer 30s x4 hammer 30s x4   EDC yellow gummy 3x10  y gummy 3x15 y gummy 3x15  y gummy 3x10 30x lrb rb yellow gummy 3x10   Arm Bike   6m 6m  6m 6m 4 res    Sled push   50# 30' x 6 50# 30' x 6  50# 30' x6                                      Ther Activity                                                                  Modalities           MHP 5 5 5  5 5                                     "

## 2023-05-15 ENCOUNTER — OFFICE VISIT (OUTPATIENT)
Dept: OBGYN CLINIC | Facility: CLINIC | Age: 58
End: 2023-05-15

## 2023-05-15 ENCOUNTER — HOSPITAL ENCOUNTER (OUTPATIENT)
Dept: RADIOLOGY | Facility: HOSPITAL | Age: 58
Discharge: HOME | End: 2023-05-15
Attending: STUDENT IN AN ORGANIZED HEALTH CARE EDUCATION/TRAINING PROGRAM

## 2023-05-15 VITALS
HEART RATE: 68 BPM | HEIGHT: 69 IN | BODY MASS INDEX: 37.53 KG/M2 | WEIGHT: 253.4 LBS | SYSTOLIC BLOOD PRESSURE: 161 MMHG | DIASTOLIC BLOOD PRESSURE: 88 MMHG

## 2023-05-15 DIAGNOSIS — S52.602A CLOSED FRACTURE DISTAL RADIUS AND ULNA, LEFT, INITIAL ENCOUNTER: Primary | ICD-10-CM

## 2023-05-15 DIAGNOSIS — S52.502A CLOSED FRACTURE DISTAL RADIUS AND ULNA, LEFT, INITIAL ENCOUNTER: ICD-10-CM

## 2023-05-15 DIAGNOSIS — S52.602A CLOSED FRACTURE DISTAL RADIUS AND ULNA, LEFT, INITIAL ENCOUNTER: ICD-10-CM

## 2023-05-15 DIAGNOSIS — S52.502A CLOSED FRACTURE DISTAL RADIUS AND ULNA, LEFT, INITIAL ENCOUNTER: Primary | ICD-10-CM

## 2023-05-15 NOTE — LETTER
May 15, 2023     Patient: Mary Grande  YOB: 1965  Date of Visit: 5/15/2023      To Whom it May Concern:    Christy Wang is under my professional care  Ebony Medellin was seen in my office on 5/15/2023  Ebonywilder CollinsLacie may return to work on 5/16/2023 full duty no restirctions  If you have any questions or concerns, please don't hesitate to call           Sincerely,          Nadege Velarde MD        CC: No Recipients

## 2023-05-15 NOTE — PROGRESS NOTES
Assessment/Plan:  Patient ID: 61 y o  female   Surgery: Open Reduction W/ Internal Fixation (orif) Radius - Left  Date of Surgery: 2/22/2023    New xrays of the left wrist were obtained today and reviewed at today's visit  Advised to use something stationary to push against to use the theraband to help pull and stretch  At this time we will release her back to work at full duty no restrictions  Follow Up:  6 weeks    To Do Next Visit:  Re-evaluate      CHIEF COMPLAINT:  Chief Complaint   Patient presents with   • Left Wrist - Post-op         SUBJECTIVE:  Patient is a 61y o  year old female who presents for follow up after Open Reduction W/ Internal Fixation (orif) Radius - Left  Today patient states she still has stiffness   She has been in OT working on her motion of the wrist  She feels that she is doing well overall, but still has residual stiffness    PHYSICAL EXAMINATION:  General: well developed and well nourished, alert, oriented times 3 and appears comfortable  Psychiatric: Normal    MUSCULOSKELETAL EXAMINATION:  Incision: Clean, dry, intact and healed  Surgery Site: normal, no evidence of infection   Range of Motion: As expected, opposition intact, full composite fist possible and wrist flexion 40 degrees, extension 50 degrees, Full pronation and supination   Neurovascular status: Neuro intact, good cap refill and radial pulse 2+        STUDIES REVIEWED:  Xrays of the left wrist 3 views were reviewed in PACS by Dr Delfina Hernandez and demonstrate stable alignment of healed distal radius fracture        PROCEDURES PERFORMED:  Procedures  No Procedures performed today    Scribe Attestation    I,:  Ayana Barnett am acting as a scribe while in the presence of the attending physician :       I,:  Juan Carlos Sharma MD personally performed the services described in this documentation    as scribed in my presence :

## 2023-07-03 ENCOUNTER — OFFICE VISIT (OUTPATIENT)
Dept: OBGYN CLINIC | Facility: CLINIC | Age: 58
End: 2023-07-03
Payer: OTHER MISCELLANEOUS

## 2023-07-03 VITALS
SYSTOLIC BLOOD PRESSURE: 146 MMHG | HEIGHT: 69 IN | HEART RATE: 74 BPM | WEIGHT: 250 LBS | DIASTOLIC BLOOD PRESSURE: 89 MMHG | BODY MASS INDEX: 37.03 KG/M2

## 2023-07-03 DIAGNOSIS — S52.502A CLOSED FRACTURE DISTAL RADIUS AND ULNA, LEFT, INITIAL ENCOUNTER: ICD-10-CM

## 2023-07-03 DIAGNOSIS — S52.602A CLOSED FRACTURE DISTAL RADIUS AND ULNA, LEFT, INITIAL ENCOUNTER: ICD-10-CM

## 2023-07-03 PROCEDURE — 99213 OFFICE O/P EST LOW 20 MIN: CPT | Performed by: STUDENT IN AN ORGANIZED HEALTH CARE EDUCATION/TRAINING PROGRAM

## 2023-07-03 NOTE — PROGRESS NOTES
ORTHOPAEDIC HAND, WRIST, AND ELBOW OFFICE  VISIT       ASSESSMENT/PLAN:      Diagnoses and all orders for this visit:    Closed fracture distal radius and ulna, left, initial encounter  -     Ambulatory Referral to Hand Surgery          61 y.o. female appx 4 months s/p ORIF left distal radius, DOS 2/22/23  The patient is doing well. We discussed a referral back to formal therapy to work on motion however, patient declined at this time. She is pleased with the outcome of her surgery thus far. She can continue without restriction and a updated note was provided for this. We discussed the fracture was intra-articular and may predispose her to arthritis in the future. She will follow up in 8 months which will be appx one year post op. We will get x-rays of her left wrist upon arrival.       Follow Up:  8 months       To Do Next Visit:  X-rays of the  left  wrist with 20 degree elevated lateral view        Akanksha Malagon MD  Attending, Orthopaedic Surgery  Hand, Wrist, and Elbow Surgery  [address] [place] [address]    ____________________________________________________________________________________________________________________________________________      CHIEF COMPLAINT:  Chief Complaint   Patient presents with   • Left Wrist - Follow-up       SUBJECTIVE:  Patient is a 61 y.o. RHD female who presents today for follow up of appx 4 months status post left wrist ORIF DOS, 2/22/23. The patient states she is doing well. She states her motion is relatively the same since her last visit. She has been performing scar massage. She denies any limitations in activities. She notes an oc twinge. She denies any pain.            I have personally reviewed all the relevant PMH, PSH, SH, FH, Medications and allergies      PAST MEDICAL HISTORY:  Past Medical History:   Diagnosis Date   • Bilateral fibrocystic breast changes    • Costochondritis    • Dense breasts    • Headache    • Hemorrhoids    • Loss of height    • Sinusitis    • Urinary frequency    • Wears glasses    • Wears glasses        PAST SURGICAL HISTORY:  Past Surgical History:   Procedure Laterality Date   • COLONOSCOPY     • INCONTINENCE SURGERY     • [place] COLONOSCOPY FLX DX W/COLLJ SPEC WHEN PFRMD N/A 10/4/2018    Procedure: COLONOSCOPY;  Surgeon: Yvonne Stubbs MD;  [facility];   Service: Gastroenterology   • AZ OPEN TREATMENT RADIAL SHAFT FRACTURE Left 2/22/2023    Procedure: OPEN REDUCTION W/ INTERNAL FIXATION (ORIF) RADIUS;  Surgeon: Jean Bocanegra MD;  Location: [facility];  Service: Orthopedics   • TUBAL LIGATION     • UMBILICAL HERNIA REPAIR         FAMILY HISTORY:  Family History   Problem Relation Age of Onset   • Anemia Mother    • Breast cancer Mother         AGE DX UNK   • Coronary artery disease Father    • Melanoma Father         malignant of skin AGE DX UNK   • Leukemia Father         AGE DX UNK   • Breast cancer Sister         AGE DX UNK   • No Known Problems Daughter    • No Known Problems Maternal Grandmother    • No Known Problems Maternal Grandfather    • No Known Problems Paternal Grandmother    • No Known Problems Paternal Grandfather    • No Known Problems Sister    • No Known Problems Brother    • No Known Problems Daughter    • Ovarian cancer Maternal Aunt 79   • Melanoma Paternal Uncle    • No Known Problems Paternal Aunt    • No Known Problems Paternal Aunt        SOCIAL HISTORY:  Social History     Tobacco Use   • Smoking status: Never   • Smokeless tobacco: Never   Vaping Use   • Vaping Use: Never used   Substance Use Topics   • Alcohol use: Yes     Comment: Socially Weekends   • Drug use: Never       MEDICATIONS:    Current Outpatient Medications:   •  Cholecalciferol (VITAMIN D3) 1000 units CAPS, Take 2,000 Units by mouth daily  , Disp: , Rfl:   •  co-enzyme Q-10 30 MG capsule, Take 30 mg by mouth 3 (three) times a day, Disp: , Rfl:   •  Garlic 9720 MG CAPS, Take 1,000 mg by mouth daily, Disp: , Rfl:   •  multivitamin (THERAGRAN) TABS, Take 1 tablet by mouth daily, Disp: , Rfl:   •  Omega-3 Fatty Acids (fish oil) 1,000 mg, Take 1,000 mg by mouth daily, Disp: , Rfl:     ALLERGIES:  No Known Allergies        REVIEW OF SYSTEMS:  Musculoskeletal:        As noted in HPI. All other systems reviewed and are negative. VITALS:  Vitals:    07/03/23 1743   BP: 146/89   Pulse: 74       LABS:  HgA1c:   Lab Results   Component Value Date    HGBA1C 5.8 09/19/2018     BMP:   Lab Results   Component Value Date    CALCIUM 9.5 02/20/2023    K 3.9 02/20/2023    CO2 27 02/20/2023     02/20/2023    BUN 13 02/20/2023    CREATININE 0.71 02/20/2023       _____________________________________________________  PHYSICAL EXAMINATION:  General: Well developed and well nourished, alert & oriented x 3, appears comfortable  Psychiatric: Normal  HEENT: Normocephalic, Atraumatic Trachea Midline, No torticollis  Pulmonary: No audible wheezing or respiratory distress   Abdomen/GI: Non tender, non distended   Cardiovascular: No pitting edema, 2+ radial pulse   Skin: No masses, erythema, lacerations, fluctation, ulcerations  Neurovascular: Sensation Intact to the Median, Ulnar, Radial Nerve, Motor Intact to the Median, Ulnar, Radial Nerve and Pulses Intact  Musculoskeletal: Normal, except as noted in detailed exam and in HPI.       MUSCULOSKELETAL EXAMINATION:  Left wrist  Flexion 40  Extension 60  Scar well healed  Compartments soft  Brisk capillary refill       ___________________________________________________  STUDIES REVIEWED:  Last OT note and x-rays were reviewed        PROCEDURES PERFORMED:  Procedures  No Procedures performed today    _____________________________________________________      Scribe Attestation    I,:  Marianne Hess MA am acting as a scribe while in the presence of the attending physician.:       I,:  Gerson Medina MD personally performed the services described in this documentation    as scribed in my presence.:

## 2023-07-03 NOTE — LETTER
July 3, 2023     Patient: Lanette Ambriz  YOB: 1965  Date of Visit: 7/3/2023      To Whom it May Concern:    Marilin Valenzuela is under my professional care. Ambrosio Reyna was seen in my office on 7/3/2023. Ambrosio Reyna may continue to work full duty with no restrictions. She will follow up in 8 months for repeat evaluation this will be appx one year follow up from surgery. If you have any questions or concerns, please don't hesitate to call.          Sincerely,          Akanksha Malagon MD

## 2023-07-05 ENCOUNTER — OFFICE VISIT (OUTPATIENT)
Dept: FAMILY MEDICINE CLINIC | Facility: CLINIC | Age: 58
End: 2023-07-05
Payer: COMMERCIAL

## 2023-07-05 VITALS
HEIGHT: 69 IN | RESPIRATION RATE: 16 BRPM | WEIGHT: 249.6 LBS | OXYGEN SATURATION: 98 % | HEART RATE: 88 BPM | SYSTOLIC BLOOD PRESSURE: 138 MMHG | BODY MASS INDEX: 36.97 KG/M2 | DIASTOLIC BLOOD PRESSURE: 70 MMHG | TEMPERATURE: 97.8 F

## 2023-07-05 DIAGNOSIS — G47.30 SLEEP DISORDER BREATHING: ICD-10-CM

## 2023-07-05 DIAGNOSIS — G47.00 INSOMNIA, UNSPECIFIED TYPE: ICD-10-CM

## 2023-07-05 DIAGNOSIS — E78.49 OTHER HYPERLIPIDEMIA: Chronic | ICD-10-CM

## 2023-07-05 DIAGNOSIS — E55.9 VITAMIN D DEFICIENCY: Chronic | ICD-10-CM

## 2023-07-05 DIAGNOSIS — E66.09 CLASS 2 OBESITY DUE TO EXCESS CALORIES WITHOUT SERIOUS COMORBIDITY WITH BODY MASS INDEX (BMI) OF 36.0 TO 36.9 IN ADULT: ICD-10-CM

## 2023-07-05 DIAGNOSIS — Z00.00 ENCOUNTER FOR WELLNESS EXAMINATION IN ADULT: Primary | ICD-10-CM

## 2023-07-05 PROCEDURE — 99396 PREV VISIT EST AGE 40-64: CPT | Performed by: FAMILY MEDICINE

## 2023-07-05 RX ORDER — TRAZODONE HYDROCHLORIDE 50 MG/1
TABLET ORAL
Qty: 60 TABLET | Refills: 1 | Status: SHIPPED | OUTPATIENT
Start: 2023-07-05

## 2023-07-05 NOTE — PROGRESS NOTES
Name: Eleuterio Ramesh      : 1965      MRN: [id number]  Encounter Provider: Fabian Mccollum MD  Encounter Date: 2023   Encounter department: [address] [place] [address]     1. Encounter for wellness examination in adult  -     Ambulatory referral to Obstetrics / Gynecology; Future    2. Class 2 obesity due to excess calories without serious comorbidity with body mass index (BMI) of 36.0 to 36.9 in adult  -     Hemoglobin A1C; Future  -     Ambulatory referral to Weight Management; Future    3. Other hyperlipidemia  -     CBC and differential; Future  -     Comprehensive metabolic panel; Future  -     Lipid Panel with Direct LDL reflex; Future  -     TSH, 3rd generation; Future    4. Vitamin D deficiency  -     Vitamin D 25 hydroxy; Future    5. Sleep disorder breathing  Assessment & Plan:  Weight gain, daytime fatigue, restless sleep  Rule out obstructive sleep apnea. Orders:  -     Ambulatory Referral to Sleep Medicine; Future    6. Insomnia, unspecified type  -     traZODone (DESYREL) 50 mg tablet; Take 1 to 2 tablets at bedtime as needed for insomnia             Subjective     Annual well exam  Concerned about weight gain  No regular exercise  Primary caretaker  to M-I-L with dementia, working full time  Not enough sleep, restless sleeper,snoring tossing and turning at night  Patient wakes up tired in am     50 lb wekght gain within nearly  3 years    Menopause    Hot flashes at imes at night    No recent GYN exam    Follows with , last mammo 2023  UTD with colonoscopy 2018    Review of Systems   Constitutional: Positive for fatigue and unexpected weight change. HENT: Negative. Eyes: Negative. Respiratory: Negative. Cardiovascular: Negative. Gastrointestinal: Negative. Endocrine: Positive for heat intolerance. Genitourinary: Negative. Musculoskeletal: Negative. Skin: Negative. Allergic/Immunologic: Negative.     Neurological: Negative. Hematological: Negative. Psychiatric/Behavioral: Positive for sleep disturbance. Past Medical History:   Diagnosis Date   • Bilateral fibrocystic breast changes    • Costochondritis    • Dense breasts    • Headache    • Hemorrhoids    • Loss of height    • Sinusitis    • Urinary frequency    • Wears glasses    • Wears glasses      Past Surgical History:   Procedure Laterality Date   • COLONOSCOPY     • INCONTINENCE SURGERY     • AL COLONOSCOPY FLX DX W/COLLJ SPEC WHEN PFRMD N/A 10/4/2018    Procedure: COLONOSCOPY;  Surgeon: Wanda Morrison MD;  Location: [facility];   Service: Gastroenterology   • AL OPEN TREATMENT RADIAL SHAFT FRACTURE Left 2/22/2023    Procedure: OPEN REDUCTION W/ INTERNAL FIXATION (ORIF) RADIUS;  Surgeon: Jack Rogers MD;  Location: [facility];  Service: Orthopedics   • TUBAL LIGATION     • UMBILICAL HERNIA REPAIR       Family History   Problem Relation Age of Onset   • Anemia Mother    • Breast cancer Mother         AGE DX UNK   • Cancer Mother    • Coronary artery disease Father    • Melanoma Father         malignant of skin AGE DX UNK   • Leukemia Father         AGE DX UNK   • Alcohol abuse Father    • Breast cancer Sister         AGE DX UNK   • No Known Problems Daughter    • No Known Problems Maternal Grandmother    • No Known Problems Maternal Grandfather    • No Known Problems Paternal Grandmother    • No Known Problems Paternal Grandfather    • No Known Problems Sister    • No Known Problems Brother    • No Known Problems Daughter    • Ovarian cancer Maternal Aunt 79   • Melanoma Paternal Uncle    • No Known Problems Paternal Aunt    • No Known Problems Paternal Aunt      Social History     Socioeconomic History   • Marital status: /Civil Union     Spouse name: None   • Number of children: 2   • Years of education: None   • Highest education level: None   Occupational History   • None   Tobacco Use   • Smoking status: Never   • Smokeless tobacco: Never   Vaping Use   • Vaping Use: Never used   Substance and Sexual Activity   • Alcohol use: Yes     Alcohol/week: 1.0 standard drink of alcohol     Types: 1 Glasses of wine per week     Comment: Socially Weekends   • Drug use: Never   • Sexual activity: Not Currently     Partners: Male     Birth control/protection: Post-menopausal, None   Other Topics Concern   • None   Social History Narrative    Caffeine use    Sabianist    Uses seatbelts     Social Determinants of Health     Financial Resource Strain: Not on file   Food Insecurity: Not on file   Transportation Needs: Not on file   Physical Activity: Not on file   Stress: Not on file   Social Connections: Not on file   Intimate Partner Violence: Not on file   Housing Stability: Not on file     Current Outpatient Medications on File Prior to Visit   Medication Sig   • CALCIUM PO Take by mouth   • Cholecalciferol (VITAMIN D3) 1000 units CAPS Take 2,000 Units by mouth daily     • co-enzyme Q-10 30 MG capsule Take 30 mg by mouth 3 (three) times a day   • Garlic 0169 MG CAPS Take 1,000 mg by mouth daily   • multivitamin (THERAGRAN) TABS Take 1 tablet by mouth daily   • Omega-3 Fatty Acids (fish oil) 1,000 mg Take 1,000 mg by mouth daily     No Known Allergies  Immunization History   Administered Date(s) Administered   • COVID-19 PFIZER VACCINE 0.3 ML IM 04/18/2021, 05/09/2021, 02/05/2022       Objective     /70   Pulse 88   Temp 97.8 °F (36.6 °C) (Temporal)   Resp 16   Ht 5' 9" (1.753 m)   Wt 113 kg (249 lb 9.6 oz)   SpO2 98%   BMI 36.86 kg/m²     Physical Exam  Vitals and nursing note reviewed. Constitutional:       General: She is not in acute distress. Appearance: Normal appearance. She is well-developed. She is not ill-appearing. HENT:      Head: Normocephalic and atraumatic. Eyes:      Conjunctiva/sclera: Conjunctivae normal.   Neck:      Thyroid: No thyromegaly. Vascular: No carotid bruit.    Cardiovascular:      Rate and Rhythm: Normal rate and regular rhythm. Heart sounds: Normal heart sounds. No murmur heard. Pulmonary:      Effort: Pulmonary effort is normal. No respiratory distress. Breath sounds: Normal breath sounds. No wheezing. Abdominal:      General: There is no distension or abdominal bruit. Palpations: Abdomen is soft. Tenderness: There is no abdominal tenderness. Hernia: No hernia is present. Musculoskeletal:         General: Normal range of motion. Cervical back: Neck supple. No rigidity. Right lower leg: No edema. Left lower leg: No edema. Skin:     General: Skin is warm. Neurological:      General: No focal deficit present. Mental Status: She is alert and oriented to person, place, and time. Cranial Nerves: No cranial nerve deficit. Coordination: Coordination normal.   Psychiatric:         Mood and Affect: Mood normal.         Behavior: Behavior normal.         Thought Content:  Thought content normal.       Nkechi Fletcher MD

## 2023-07-07 ENCOUNTER — APPOINTMENT (OUTPATIENT)
Dept: LAB | Facility: CLINIC | Age: 58
End: 2023-07-07
Payer: COMMERCIAL

## 2023-07-07 DIAGNOSIS — E55.9 VITAMIN D DEFICIENCY: Chronic | ICD-10-CM

## 2023-07-07 DIAGNOSIS — E78.49 OTHER HYPERLIPIDEMIA: Chronic | ICD-10-CM

## 2023-07-07 DIAGNOSIS — E66.09 CLASS 2 OBESITY DUE TO EXCESS CALORIES WITHOUT SERIOUS COMORBIDITY WITH BODY MASS INDEX (BMI) OF 36.0 TO 36.9 IN ADULT: ICD-10-CM

## 2023-07-07 LAB
25(OH)D3 SERPL-MCNC: 58.9 NG/ML (ref 30–100)
ALBUMIN SERPL BCP-MCNC: 3.7 G/DL (ref 3.5–5)
ALP SERPL-CCNC: 100 U/L (ref 46–116)
ALT SERPL W P-5'-P-CCNC: 20 U/L (ref 12–78)
ANION GAP SERPL CALCULATED.3IONS-SCNC: 6 MMOL/L
AST SERPL W P-5'-P-CCNC: 11 U/L (ref 5–45)
BASOPHILS # BLD AUTO: 0.03 THOUSANDS/ÂΜL (ref 0–0.1)
BASOPHILS NFR BLD AUTO: 1 % (ref 0–1)
BILIRUB SERPL-MCNC: 1.23 MG/DL (ref 0.2–1)
BUN SERPL-MCNC: 15 MG/DL (ref 5–25)
CALCIUM SERPL-MCNC: 9.2 MG/DL (ref 8.3–10.1)
CHLORIDE SERPL-SCNC: 108 MMOL/L (ref 96–108)
CHOLEST SERPL-MCNC: 284 MG/DL
CO2 SERPL-SCNC: 25 MMOL/L (ref 21–32)
CREAT SERPL-MCNC: 0.68 MG/DL (ref 0.6–1.3)
EOSINOPHIL # BLD AUTO: 0.11 THOUSAND/ÂΜL (ref 0–0.61)
EOSINOPHIL NFR BLD AUTO: 2 % (ref 0–6)
ERYTHROCYTE [DISTWIDTH] IN BLOOD BY AUTOMATED COUNT: 13.2 % (ref 11.6–15.1)
GFR SERPL CREATININE-BSD FRML MDRD: 96 ML/MIN/1.73SQ M
GLUCOSE P FAST SERPL-MCNC: 109 MG/DL (ref 65–99)
HCT VFR BLD AUTO: 40.4 % (ref 34.8–46.1)
HDLC SERPL-MCNC: 74 MG/DL
HGB BLD-MCNC: 13.1 G/DL (ref 11.5–15.4)
IMM GRANULOCYTES # BLD AUTO: 0.02 THOUSAND/UL (ref 0–0.2)
IMM GRANULOCYTES NFR BLD AUTO: 0 % (ref 0–2)
LDLC SERPL CALC-MCNC: 187 MG/DL (ref 0–100)
LYMPHOCYTES # BLD AUTO: 3.03 THOUSANDS/ÂΜL (ref 0.6–4.47)
LYMPHOCYTES NFR BLD AUTO: 47 % (ref 14–44)
MCH RBC QN AUTO: 29.6 PG (ref 26.8–34.3)
MCHC RBC AUTO-ENTMCNC: 32.4 G/DL (ref 31.4–37.4)
MCV RBC AUTO: 91 FL (ref 82–98)
MONOCYTES # BLD AUTO: 0.6 THOUSAND/ÂΜL (ref 0.17–1.22)
MONOCYTES NFR BLD AUTO: 9 % (ref 4–12)
NEUTROPHILS # BLD AUTO: 2.67 THOUSANDS/ÂΜL (ref 1.85–7.62)
NEUTS SEG NFR BLD AUTO: 41 % (ref 43–75)
NRBC BLD AUTO-RTO: 0 /100 WBCS
PLATELET # BLD AUTO: 323 THOUSANDS/UL (ref 149–390)
PMV BLD AUTO: 8.6 FL (ref 8.9–12.7)
POTASSIUM SERPL-SCNC: 3.9 MMOL/L (ref 3.5–5.3)
PROT SERPL-MCNC: 7.4 G/DL (ref 6.4–8.4)
RBC # BLD AUTO: 4.43 MILLION/UL (ref 3.81–5.12)
SODIUM SERPL-SCNC: 139 MMOL/L (ref 135–147)
TRIGL SERPL-MCNC: 117 MG/DL
TSH SERPL DL<=0.05 MIU/L-ACNC: 1.35 UIU/ML (ref 0.45–4.5)
WBC # BLD AUTO: 6.46 THOUSAND/UL (ref 4.31–10.16)

## 2023-07-07 PROCEDURE — 80061 LIPID PANEL: CPT

## 2023-07-07 PROCEDURE — 80053 COMPREHEN METABOLIC PANEL: CPT

## 2023-07-07 PROCEDURE — 84443 ASSAY THYROID STIM HORMONE: CPT

## 2023-07-07 PROCEDURE — 82306 VITAMIN D 25 HYDROXY: CPT

## 2023-07-07 PROCEDURE — 36415 COLL VENOUS BLD VENIPUNCTURE: CPT

## 2023-07-07 PROCEDURE — 83036 HEMOGLOBIN GLYCOSYLATED A1C: CPT

## 2023-07-07 PROCEDURE — 85025 COMPLETE CBC W/AUTO DIFF WBC: CPT

## 2023-07-08 PROBLEM — E66.812 CLASS 2 OBESITY DUE TO EXCESS CALORIES WITHOUT SERIOUS COMORBIDITY WITH BODY MASS INDEX (BMI) OF 36.0 TO 36.9 IN ADULT: Status: ACTIVE | Noted: 2023-07-08

## 2023-07-08 PROBLEM — G47.30 SLEEP DISORDER BREATHING: Status: ACTIVE | Noted: 2023-07-08

## 2023-07-08 PROBLEM — E66.09 CLASS 2 OBESITY DUE TO EXCESS CALORIES WITHOUT SERIOUS COMORBIDITY WITH BODY MASS INDEX (BMI) OF 36.0 TO 36.9 IN ADULT: Status: ACTIVE | Noted: 2023-07-08

## 2023-07-08 PROBLEM — G47.00 INSOMNIA: Status: ACTIVE | Noted: 2023-07-08

## 2023-07-08 PROBLEM — L30.9 DERMATITIS: Status: RESOLVED | Noted: 2021-06-13 | Resolved: 2023-07-08

## 2023-07-09 LAB
EST. AVERAGE GLUCOSE BLD GHB EST-MCNC: 120 MG/DL
HBA1C MFR BLD: 5.8 %

## 2023-07-12 ENCOUNTER — TELEPHONE (OUTPATIENT)
Dept: FAMILY MEDICINE CLINIC | Facility: CLINIC | Age: 58
End: 2023-07-12

## 2023-07-12 DIAGNOSIS — R17 ELEVATED BILIRUBIN: Primary | ICD-10-CM

## 2023-07-12 NOTE — TELEPHONE ENCOUNTER
Please contact patient. I reviewed results of recent blood work and sent her [employer] message. Please kindly ask her to review and reply.   Thank you

## 2023-07-17 ENCOUNTER — HOSPITAL ENCOUNTER (OUTPATIENT)
Dept: RADIOLOGY | Facility: HOSPITAL | Age: 58
Discharge: HOME | End: 2023-07-17
Payer: COMMERCIAL

## 2023-07-17 DIAGNOSIS — R17 ELEVATED BILIRUBIN: ICD-10-CM

## 2023-07-17 PROCEDURE — 76705 ECHO EXAM OF ABDOMEN: CPT

## 2023-08-08 ENCOUNTER — VBI (OUTPATIENT)
Dept: ADMINISTRATIVE | Facility: OTHER | Age: 58
End: 2023-08-08

## 2023-08-09 ENCOUNTER — TELEPHONE (OUTPATIENT)
Age: 58
End: 2023-08-09

## 2023-08-09 NOTE — TELEPHONE ENCOUNTER
Caller: Patient     Doctor/Office: POD    Call regarding :  Calling to reschedule apt      Call was transferred to: POD

## 2023-08-17 ENCOUNTER — OFFICE VISIT (OUTPATIENT)
Dept: PODIATRY | Facility: CLINIC | Age: 58
End: 2023-08-17
Payer: COMMERCIAL

## 2023-08-17 VITALS
HEIGHT: 69 IN | BODY MASS INDEX: 36.29 KG/M2 | WEIGHT: 245 LBS | HEART RATE: 67 BPM | DIASTOLIC BLOOD PRESSURE: 80 MMHG | SYSTOLIC BLOOD PRESSURE: 123 MMHG

## 2023-08-17 DIAGNOSIS — M72.2 PLANTAR FASCIITIS, LEFT: Primary | ICD-10-CM

## 2023-08-17 PROCEDURE — 20550 NJX 1 TENDON SHEATH/LIGAMENT: CPT | Performed by: PODIATRIST

## 2023-08-17 PROCEDURE — 99202 OFFICE O/P NEW SF 15 MIN: CPT | Performed by: PODIATRIST

## 2023-08-17 RX ORDER — TRIAMCINOLONE ACETONIDE 40 MG/ML
40 INJECTION, SUSPENSION INTRA-ARTICULAR; INTRAMUSCULAR ONCE
Status: COMPLETED | OUTPATIENT
Start: 2023-08-17 | End: 2023-08-17

## 2023-08-17 RX ORDER — LIDOCAINE HYDROCHLORIDE 10 MG/ML
1 INJECTION, SOLUTION INFILTRATION; PERINEURAL ONCE
Status: COMPLETED | OUTPATIENT
Start: 2023-08-17 | End: 2023-08-17

## 2023-08-17 RX ADMIN — TRIAMCINOLONE ACETONIDE 40 MG: 40 INJECTION, SUSPENSION INTRA-ARTICULAR; INTRAMUSCULAR at 08:37

## 2023-08-17 RX ADMIN — LIDOCAINE HYDROCHLORIDE 1 ML: 10 INJECTION, SOLUTION INFILTRATION; PERINEURAL at 08:37

## 2023-08-17 NOTE — PATIENT INSTRUCTIONS
Plantar Fasciitis Exercises   WHAT YOU NEED TO KNOW:   Plantar fasciitis exercises help stretch your plantar fascia, calf muscles, and Achilles tendon. They also help strengthen the muscles that support your heel and foot. Exercises and stretching can help prevent plantar fasciitis from getting worse or coming back. DISCHARGE INSTRUCTIONS:   Call your doctor or physical therapist if:   Your pain and swelling increase. You develop new knee, hip, or back pain. You have questions or concerns about your condition or care. How to do plantar fasciitis exercises:  Ask your healthcare provider when to start these exercises and how often to do them. Slant board stretch:  Stand on a slanted board with your toes higher than your heel. Press your heel into the board. Keep your knee slightly bent. Hold this position for 1 minute. Repeat 5 times. Heel stretch:  Stand up straight with your hands on a wall. Place your injured leg slightly behind your other leg. Keep your heels flat on the floor, lean forward, and bend both knees. Hold for 30 seconds. Calf stretch:  Stand up straight with your hands on a wall. Step forward so that your uninjured foot is in front of your injured foot. Keep your front leg bent and your back leg straight. Gently lean forward until you feel your calf stretch. Hold for 30 seconds and then relax. Seated plantar fascia stretch:  Sit on a firm surface, such as the floor or a mat. Extend your legs out in front of you. Raise your injured foot a few inches off the ground. Keep your leg straight. Grab the toes of your injured foot and pull them toward you. With your other hand, feel your plantar fascia. You should feel it press outward. Hold for 30 seconds. If you cannot reach your toes, loop a towel or tie around your foot. Gently pull on the towel or tie and flex your toes toward you. Heel raises:  Stand on the injured leg. Raise your other leg off the ground. Hold onto a railing or wall for balance. Slowly rise up on the toes of your injured leg. Hold for 5 seconds. Slowly lower your heel to the ground. Toe curls:  Place a towel on the floor. Put your foot flat on the towel. Grab the towel with your toes by curling them around the towel. Lift the towel up with your toes. Toe taps:  Sit down and place your foot flat on the floor. Keep your heel on the floor. Point all your toes up toward the ceiling. While the 4 smaller toes are pointed up, bend your big toe down and tap it on the ground. Do 10 to 50 taps. Point all 5 toes up toward the ceiling again. This time keep your big toe pointed up and tap the 4 smaller toes on the ground. Do 10 to 50 taps each time. Follow up with your doctor or physical therapist as directed:  Write down your questions so you remember to ask them during your visits. © Copyright Alfon[employer] Crespo 2022 Information is for End User's use only and may not be sold, redistributed or otherwise used for commercial purposes. The above information is an  only. It is not intended as medical advice for individual conditions or treatments. Talk to your doctor, nurse or pharmacist before following any medical regimen to see if it is safe and effective for you.

## 2023-08-17 NOTE — PROGRESS NOTES
Assessment/Plan:       Diagnoses and all orders for this visit:    Plantar fasciitis, left  -     triamcinolone acetonide (KENALOG-40) 40 mg/mL injection 40 mg  -     lidocaine (XYLOCAINE) 1 % injection 1 mL      Diagnosis and options discussed with patient  Patient agreeable to the plan as stated below    1. Discussed diagnosis and treatment options  2. Provided home therapy and stretching exercises  3. Stressed compliance with home/formal PT and arch supports. After answering all the patient's questions, I injected left heel with 0.5cc kenalog 40 and 1cc 1% lidocaine plain. Oral directions were given for rest and ice on the affected heel(s) and elevation. The ice is to be used for approximately 20 minutes at a time, 1-2 times a day for a few days. Home therapy instructions were demonstrated and a copy was given to the patient. The patient is to call at once if there is any increased pain, swelling, tenderness, redness, etc.      Subjective:      Patient ID: Azalea Mcclellan is a 60 y.o. female. Patient has a lot of left heel pain. There is some swelling, the left foot feels tight in shoes. Pain is worst after periods of rest. She had plantar fascitis years ago, she thinks it's come back. Pain is plantar heel, shoots up the ankle. Pain began a few months ago. The following portions of the patient's history were reviewed and updated as appropriate: allergies, current medications, past family history, past medical history, past social history, past surgical history and problem list.    Review of Systems    Constitutional: Negative. Respiratory: Negative for cough and shortness of breath. Gastrointestinal: Negative for diarrhea, nausea and vomiting. Musculoskeletal: heel pain  Skin: Negative for rash or wound. Neurological: Negative for weakness, numbness and headaches.        Objective:      /80   Pulse 67   Ht 5' 9" (1.753 m)   Wt 111 kg (245 lb)   BMI 36.18 kg/m² Physical Exam    Vitals reviewed    Constitutional: Patient is not distressed. Patient is well developed    Vascular: Dorsalis pedis and posterior tibial pulses 2/4. Capillary refill time within normal limits to all digits. No erythema. No edema. Dermatology: No rash, no open lesions. Present pedal hair. Musculoskeletal: Normal range of motion to subtalar joint, and midtarsal joint. Normal range of motion first MTPJ. Manual muscle testing 5 out of 5 for inversion/eversion/dorsiflexion/plantarflexion. Pain on medial band insertion of plantar fascia on the left foot    Plantar fascia is taut but intact in central arch. No fibroma palpated    Patient demonstrates moderate ankle equinus  On stance, patient shows moderate pes planus    Neurological exam: Monofilament sensation intact. Vibratory sensation intact.  Achilles reflex is normal.\

## 2023-08-30 DIAGNOSIS — G47.00 INSOMNIA, UNSPECIFIED TYPE: ICD-10-CM

## 2023-08-30 RX ORDER — TRAZODONE HYDROCHLORIDE 50 MG/1
TABLET ORAL
Qty: 60 TABLET | Refills: 1 | Status: SHIPPED | OUTPATIENT
Start: 2023-08-30

## 2023-09-05 ENCOUNTER — HOSPITAL ENCOUNTER (OUTPATIENT)
Dept: ULTRASOUND IMAGING | Facility: CLINIC | Age: 58
Discharge: HOME | End: 2023-09-05
Payer: COMMERCIAL

## 2023-09-05 DIAGNOSIS — Z80.3 FAMILY HISTORY OF BREAST CANCER IN FEMALE: ICD-10-CM

## 2023-09-05 DIAGNOSIS — R92.2 DENSE BREASTS: ICD-10-CM

## 2023-09-05 PROCEDURE — 76641 ULTRASOUND BREAST COMPLETE: CPT

## 2023-09-12 ENCOUNTER — TELEPHONE (OUTPATIENT)
Dept: GENETICS | Facility: CLINIC | Age: 58
End: 2023-09-12

## 2023-09-12 NOTE — TELEPHONE ENCOUNTER
I spoke with Shashi Nicholas to confirm her upcoming appointment, she would like to convert her appointment to a televisit given that our MA will not be in the office.

## 2023-09-20 ENCOUNTER — OFFICE VISIT (OUTPATIENT)
Dept: PODIATRY | Facility: CLINIC | Age: 58
End: 2023-09-20
Payer: COMMERCIAL

## 2023-09-20 VITALS
SYSTOLIC BLOOD PRESSURE: 122 MMHG | HEART RATE: 78 BPM | DIASTOLIC BLOOD PRESSURE: 77 MMHG | WEIGHT: 245 LBS | HEIGHT: 69 IN | BODY MASS INDEX: 36.29 KG/M2

## 2023-09-20 DIAGNOSIS — M72.2 PLANTAR FASCIITIS, LEFT: Primary | ICD-10-CM

## 2023-09-20 PROCEDURE — 20550 NJX 1 TENDON SHEATH/LIGAMENT: CPT | Performed by: PODIATRIST

## 2023-09-20 RX ORDER — LIDOCAINE HYDROCHLORIDE 10 MG/ML
1 INJECTION, SOLUTION INFILTRATION; PERINEURAL ONCE
Status: DISCONTINUED | OUTPATIENT
Start: 2023-09-20 | End: 2023-09-20

## 2023-09-20 RX ORDER — TRIAMCINOLONE ACETONIDE 40 MG/ML
40 INJECTION, SUSPENSION INTRA-ARTICULAR; INTRAMUSCULAR ONCE
Status: DISCONTINUED | OUTPATIENT
Start: 2023-09-20 | End: 2023-09-20

## 2023-09-20 RX ORDER — LIDOCAINE HYDROCHLORIDE 5 MG/ML
0.5 INJECTION, SOLUTION INFILTRATION; PERINEURAL
Status: SHIPPED | OUTPATIENT
Start: 2023-09-20

## 2023-09-20 RX ORDER — TRIAMCINOLONE ACETONIDE 40 MG/ML
40 INJECTION, SUSPENSION INTRA-ARTICULAR; INTRAMUSCULAR
Status: SHIPPED | OUTPATIENT
Start: 2023-09-20

## 2023-09-20 RX ADMIN — LIDOCAINE HYDROCHLORIDE 0.5 ML: 5 INJECTION, SOLUTION INFILTRATION; PERINEURAL at 08:00

## 2023-09-20 RX ADMIN — TRIAMCINOLONE ACETONIDE 40 MG: 40 INJECTION, SUSPENSION INTRA-ARTICULAR; INTRAMUSCULAR at 08:00

## 2023-09-20 NOTE — PROGRESS NOTES
Assessment/Plan:      Diagnoses and all orders for this visit:    Plantar fasciitis, left  -     Discontinue: triamcinolone acetonide (KENALOG-40) 40 mg/mL injection 40 mg  -     Discontinue: lidocaine (XYLOCAINE) 1 % injection 1 mL  -     Foot/lower extremity injection  -     lidocaine (XYLOCAINE) 0.5 % injection 0.5 mL  -     triamcinolone acetonide (KENALOG-40) 40 mg/mL injection 40 mg      After answering all the patient's questions, I injected left heel with 0.5cc kenalog 40 and 1cc 1% lidocaine plain. Oral directions were given for rest and ice on the affected heel(s) and elevation. The ice is to be used for approximately 20 minutes at a time, 1-2 times a day for a few days. Home therapy instructions were demonstrated and a copy was given to the patient. The patient is to call at once if there is any increased pain, swelling, tenderness, redness, etc.    She is 50% improved. Recommend a second injection. Contiue to do therapy 3x/day.  RTC 4-6 weeks    Foot/lower extremity injection    Performed by: Birgit Keller DPM  Authorized by: Birgit Keller DPM    Procedure:     Verbal consent obtained?: Yes      Risks and benefits: Risks, benefits and alternatives were discussed      Consent given by:  Patient    Time out: Immediately prior to the procedure a time out was called      Time out performed at:  9/20/2023 8:15 AM    Patient states understanding of procedure being performed: Yes      Patient identity confirmed:  Verbally with patient    Supporting Documentation:     Indications:  Pain    Procedure Details:      Needle size: 25 G G    Ultrasound Guidance: no      Approach:  Medial    Laterality:  Left    Cyst Aspiration/Injection: No      Location: aponeurosis      Aponeurosis Structures: Plantar fascia instep      Injection Information:       Medications:  0.5 mL lidocaine 0.5 %; 40 mg triamcinolone acetonide 40 mg/mL    Patient tolerance:  Patient tolerated the procedure well with no immediate complications          Subjective:     Patient ID: Leeanna Rea is a 60 y.o. female. Patient f/u for left plantar fascitis. She got an injection mid August and was instructed to do daily home therapy 3x/day. She was on vacation and walked a lot. She says she is about 50% better but admits she was doing 15,000 steps per day while on vacation. Review of Systems   Constitutional: Negative. Musculoskeletal: Positive for arthralgias (heel pain). Negative for gait problem. Neurological: Negative for numbness. Objective:     Physical Exam  Vitals reviewed. Constitutional:       Appearance: She is not ill-appearing or diaphoretic. Cardiovascular:      Rate and Rhythm: Normal rate. Pulses: Normal pulses. Pulmonary:      Effort: Pulmonary effort is normal. No respiratory distress. Skin:     Capillary Refill: Capillary refill takes less than 2 seconds. Findings: No erythema or rash. Neurological:      Mental Status: She is alert and oriented to person, place, and time. Sensory: No sensory deficit.       Gait: Gait normal.

## 2023-09-21 ENCOUNTER — CLINICAL SUPPORT (OUTPATIENT)
Dept: GENETICS | Facility: CLINIC | Age: 58
End: 2023-09-21

## 2023-09-21 DIAGNOSIS — Z80.3 FAMILY HISTORY OF BREAST CANCER IN FEMALE: ICD-10-CM

## 2023-09-21 NOTE — PROGRESS NOTES
Pre-Test Genetic Counseling Consult Note    Patient Name: Abraham Costa   /Age: 1965/58 y.o. Referring Provider: SHELBY Goodman    Date of Service: 2023  Genetic Counselor: Myah Finley MS, [facility]  Interpretation Services: None  Location: Telephone consult   Length of Visit: 30 Minutes    Amy Miramontes was referred to the [address] [place], [place] [address] and Genetic Assessment Program due to her family history of breast cancer, ovarian cancer, and melanoma. she presents today to discuss the possibility of a hereditary cancer syndrome, options for genetic testing, and implications for her and her family. Cancer History and Treatment:   Personal History: no personal history of cancer    Screening Hx:   Breast:  Breast Imaging:   US Breast Screening Bilateral (2023): IMPRESSION:   No ultrasound evidence of invasive breast malignancy    Mammogram (2023): IMPRESSION:  No mammographic evidence of malignancy. Breast Density: Scattered fibroglandular density   Breast Biopsy: none    Colon:  Colonoscopy (): 0 polyps reported  F/u recommended in 10 years     Gynecologic:  Ovaries and Uterus intact     Skin:  Sees derm annually    Other screening: none    Reproductive History  Age at menarche: 11  Age at first live birth: 28  Menopause: Post Menopausal (47)  Hormone replacement: none     Medical and Surgical History  Pertinent surgical history:   Past Surgical History:   Procedure Laterality Date   • COLONOSCOPY     • INCONTINENCE SURGERY     • [place] COLONOSCOPY FLX DX W/COLLJ SPEC WHEN PFRMD N/A 10/4/2018    Procedure: COLONOSCOPY;  Surgeon: Ezio Birmingham MD;  Location: [facility]  GI LAB;   Service: Gastroenterology   • [place] OPEN TREATMENT RADIAL SHAFT FRACTURE Left 2023    Procedure: OPEN REDUCTION W/ INTERNAL FIXATION (ORIF) RADIUS;  Surgeon: Earl Layton MD;  Location: [facility] MAIN OR;  Service: Orthopedics   • TUBAL LIGATION     • UMBILICAL HERNIA REPAIR        Pertinent medical history:  Past Medical History:   Diagnosis Date   • Bilateral fibrocystic breast changes    • Costochondritis    • Dense breasts    • Headache    • Hemorrhoids    • Loss of height    • Sinusitis    • Urinary frequency    • Wears glasses    • Wears glasses          Other History:  Height:   Ht Readings from Last 1 Encounters:   23 5' 9" (1.753 m)     Weight:   Wt Readings from Last 1 Encounters:   23 111 kg (245 lb)       Relevant Family History   Patient reports non-Ashkenazi Restorationist ancestry. Sister: bilateral breast cancer diagnosed at ~51 y/o, s/p bilateral mastectomy (living)   Nephew: brain cancer, unknown type, diagnosed at 26 y/o (living)    Maternal Family History: Mother: breast cancer diagnosed at 76, s/p lumpectomy + RT (currently 79 y/o)  Aunt:  Ovarian cancer diagnosed at 79 (d.82)    Paternal Family History:  Father: leukemia diagnosed at 54; melanoma of thumb diagnosed at 79 (d.73)   Uncle: metastatic cancer, primary site unknown, no info ()     Please refer to the scanned pedigree in the Media Tab for a complete family history     *All history is reported as provided by the patient; records are not available for review, except where indicated. Assessment:  We discussed sporadic, familial and hereditary cancer. We reviewed that only 5-10% of cancers are considered hereditary. Cancers such as lung, cancer, cervical cancer, testicular cancer, and liver cancer very rarely have a hereditary etiology, and we cannot test for a genetic predisposition for these cancers at this time. We also discussed the many factors that influence our risk for cancer such as age, environmental exposures, lifestyle choices and family history. We reviewed the indications suggestive of a hereditary predisposition to cancer.     Genetic testing is indicated for Zheng Marie based on the following criteria: Meets NCCN C0.4547 Testing Criteria for High-Penetrance Breast Cancer Susceptibility Genes: first-degree relative (sister) with multiple primary breast cancer diagnoses  Meets NCCN N6.4583 Testing Criteria for Ovarian Cancer Susceptibility Genes: second-degree relative (maternal aunt) with ovarian cancer diagnosis      Ester Uribe is unaffected, but is considering genetic testing due to a family history of breast and ovarian cancer. Although Johana Platt sister is the most informative person to undergo genetic testing, Ester Uribe can consider genetic testing due to the following:   Patient does not have cancer; however, their sister has declined testing    and therefore patient is the most informative person for testing. The risks, benefits, and limitations of genetic testing were reviewed with the patient, as well as genetic discrimination laws, and possible test results (positive, negative, variants of uncertain significance) and their clinical implications. If positive for a mutation, options for managing cancer risk including increased surveillance, chemoprevention, and in some cases prophylactic surgery were discussed. Ester Uribe was informed that if a hereditary cancer syndrome was identified in her, first degree relatives (parents, siblings, and children) have a chance of also inheriting the condition. Genetic testing would allow for predictive genetic testing in other relatives, who may also be at risk depending on their degree of relation. Billing:  Most individuals pay <$100 for hereditary cancer genetic testing. If insurance covers the cost of the testing, individuals may still pay out of pocket secondary to co-pays, co-insurance, or deductibles. If the cost of the testing exceeds $100, the lab will reach out to the patient via phone or e-mail. The patient will then have the option to proceed with the testing, cancel the testing, or elect the self-pay option of $250. Ester Uribe verbalized understanding.        Plan: Patient decided to proceed with testing and provided consent. Summary:     Sample Collection:  A blood kit was mailed home to the patient    Genetic Testing Preformed: [facility] CustomNext Cancer Panel + MelanomNext + RNA (47 genes): APC, SULAIMAN, AXIN2, BAP1, BARD1, BMPR1A, BRCA1, BRCA2, BRIP1, CDH1, CDK4, CDKN2A, CHEK2, CTNNA1, DICER1, EPCAM, GREM1, HOXB13, KIT, MEN1, MITF, MLH1, MSH2, MSH3, MSH6, MUTYH, NBN, NF1, NTHL1, PALB2, PDGFRA, PMS2, POLD1, POLE, POT1, PTEN, RAD50, RAD51C, RAD51D, RB1, SDHA, SDHB, SDHC, SDHD, SMAD4, SMARCA4, STK11, TP53, TSC1, TSC2, VHL      Result Call Information:  In the event that we need to reach Hernan Go via telephone:  I confirmed the patient's mobile number on file as the best number to call with results  I confirmed with the patient that we can leave a voicemail on her mobile number    Results take approximately 2-3 weeks to complete once test is started. Hernan Go will be notified via [employer] once results are available. Additional recommendations for surveillance/medical management will be made pending genetic test results.

## 2023-09-21 NOTE — LETTER
2023     Becca Bishop, [address] [facility]    Patient: Dulce Maria Ferrera  YOB: 1965  Date of Visit: 2023      Dear Dr. Emmy Sweet: Thank you for referring Alek Martinez to me for evaluation. Below are my notes for this consultation. If you have questions, please do not hesitate to call me. I look forward to following your patient along with you. Sincerely,        Sugar Anderson        CC: No Recipients        Pre-Test Genetic Counseling Consult Note    Patient Name: Dulce Maria Ferrera   /Age: 1965/58 y.o. Referring Provider: SHELBY Lan    Date of Service: 2023  Genetic Counselor: Sugar Anderson MS, [facility]  Interpretation Services: None  Location: Telephone consult   Length of Visit: 30 Minutes    Mayer Aase was referred to the [address] [place] [address] and Genetic Assessment Program due to her family history of breast cancer, ovarian cancer, and melanoma. she presents today to discuss the possibility of a hereditary cancer syndrome, options for genetic testing, and implications for her and her family. Cancer History and Treatment:   Personal History: no personal history of cancer    Screening Hx:   Breast:  Breast Imaging:   US Breast Screening Bilateral (2023): IMPRESSION:   No ultrasound evidence of invasive breast malignancy    Mammogram (2023): IMPRESSION:  No mammographic evidence of malignancy.   Breast Density: Scattered fibroglandular density   Breast Biopsy: none    Colon:  Colonoscopy (2018): 0 polyps reported  F/u recommended in 10 years     Gynecologic:  Ovaries and Uterus intact     Skin:  Sees derm annually    Other screening: none    Reproductive History  Age at menarche: 11  Age at first live birth: 28  Menopause: Post Menopausal (47)  Hormone replacement: none     Medical and Surgical History  Pertinent surgical history:   Past Surgical History:   Procedure Laterality Date   • COLONOSCOPY     • INCONTINENCE SURGERY     • [place] COLONOSCOPY FLX DX W/COLLJ SPEC WHEN PFRMD N/A 10/4/2018    Procedure: COLONOSCOPY;  Surgeon: Roxy Dennison MD;  Location: [facility]  GI LAB; Service: Gastroenterology   • [place] OPEN TREATMENT RADIAL SHAFT FRACTURE Left 2023    Procedure: OPEN REDUCTION W/ INTERNAL FIXATION (ORIF) RADIUS;  Surgeon: Alysia Mills MD;  Location: [facility] MAIN OR;  Service: Orthopedics   • TUBAL LIGATION     • UMBILICAL HERNIA REPAIR        Pertinent medical history:  Past Medical History:   Diagnosis Date   • Bilateral fibrocystic breast changes    • Costochondritis    • Dense breasts    • Headache    • Hemorrhoids    • Loss of height    • Sinusitis    • Urinary frequency    • Wears glasses    • Wears glasses          Other History:  Height:   Ht Readings from Last 1 Encounters:   23 5' 9" (1.753 m)     Weight:   Wt Readings from Last 1 Encounters:   23 111 kg (245 lb)       Relevant Family History   Patient reports non-Ashkenazi Shinto ancestry. Sister: bilateral breast cancer diagnosed at ~53 y/o, s/p bilateral mastectomy (living)   Nephew: brain cancer, unknown type, diagnosed at 28 y/o (living)    Maternal Family History: Mother: breast cancer diagnosed at 76, s/p lumpectomy + RT (currently 79 y/o)  Aunt:  Ovarian cancer diagnosed at 79 (d.82)    Paternal Family History:  Father: leukemia diagnosed at 54; melanoma of thumb diagnosed at 79 (d.73)   Uncle: metastatic cancer, primary site unknown, no info ()     Please refer to the scanned pedigree in the Media Tab for a complete family history     *All history is reported as provided by the patient; records are not available for review, except where indicated. Assessment:  We discussed sporadic, familial and hereditary cancer. We reviewed that only 5-10% of cancers are considered hereditary.  Cancers such as lung, cancer, cervical cancer, testicular cancer, and liver cancer very rarely have a hereditary etiology, and we cannot test for a genetic predisposition for these cancers at this time. We also discussed the many factors that influence our risk for cancer such as age, environmental exposures, lifestyle choices and family history. We reviewed the indications suggestive of a hereditary predisposition to cancer. Genetic testing is indicated for Glenny Bolanos based on the following criteria: Meets NCCN O0.7789 Testing Criteria for High-Penetrance Breast Cancer Susceptibility Genes: first-degree relative (sister) with multiple primary breast cancer diagnoses  Meets NCCN V1.2024 Testing Criteria for Ovarian Cancer Susceptibility Genes: second-degree relative (maternal aunt) with ovarian cancer diagnosis      Glenny Bolanos is unaffected, but is considering genetic testing due to a family history of breast and ovarian cancer. Although Ade Malcolm sister is the most informative person to undergo genetic testing, Glenny Bolanos can consider genetic testing due to the following:   Patient does not have cancer; however, their sister has declined testing    and therefore patient is the most informative person for testing. The risks, benefits, and limitations of genetic testing were reviewed with the patient, as well as genetic discrimination laws, and possible test results (positive, negative, variants of uncertain significance) and their clinical implications. If positive for a mutation, options for managing cancer risk including increased surveillance, chemoprevention, and in some cases prophylactic surgery were discussed. Glenny Bolanos was informed that if a hereditary cancer syndrome was identified in her, first degree relatives (parents, siblings, and children) have a chance of also inheriting the condition. Genetic testing would allow for predictive genetic testing in other relatives, who may also be at risk depending on their degree of relation.      Billing:  Most individuals pay <$100 for hereditary cancer genetic testing. If insurance covers the cost of the testing, individuals may still pay out of pocket secondary to co-pays, co-insurance, or deductibles. If the cost of the testing exceeds $100, the lab will reach out to the patient via phone or e-mail. The patient will then have the option to proceed with the testing, cancel the testing, or elect the self-pay option of $250. Gregory Werner verbalized understanding. Plan: Patient decided to proceed with testing and provided consent. Summary:     Sample Collection:  A blood kit was mailed home to the patient    Genetic Testing Preformed: [facility] Cancer Panel + MelanomNext + RNA (47 genes): APC, SULAIMAN, AXIN2, BAP1, BARD1, BMPR1A, BRCA1, BRCA2, BRIP1, CDH1, CDK4, CDKN2A, CHEK2, CTNNA1, DICER1, EPCAM, GREM1, HOXB13, KIT, MEN1, MITF, MLH1, MSH2, MSH3, MSH6, MUTYH, NBN, NF1, NTHL1, PALB2, PDGFRA, PMS2, POLD1, POLE, POT1, PTEN, RAD50, RAD51C, RAD51D, RB1, SDHA, SDHB, SDHC, SDHD, SMAD4, SMARCA4, STK11, TP53, TSC1, TSC2, VHL      Result Call Information:  In the event that we need to reach Gregory Werner via telephone:  I confirmed the patient's mobile number on file as the best number to call with results  I confirmed with the patient that we can leave a voicemail on her mobile number    Results take approximately 2-3 weeks to complete once test is started. Gregory Werner will be notified via [employer] once results are available. Additional recommendations for surveillance/medical management will be made pending genetic test results.

## 2023-09-27 ENCOUNTER — APPOINTMENT (OUTPATIENT)
Dept: LAB | Age: 58
End: 2023-09-27
Payer: COMMERCIAL

## 2023-09-27 DIAGNOSIS — Z80.3 FAMILY HISTORY OF MALIGNANT NEOPLASM OF BREAST: ICD-10-CM

## 2023-09-27 DIAGNOSIS — Z80.41 FAMILY HISTORY OF MALIGNANT NEOPLASM OF OVARY: ICD-10-CM

## 2023-09-27 PROCEDURE — 36415 COLL VENOUS BLD VENIPUNCTURE: CPT

## 2023-10-11 ENCOUNTER — ANNUAL EXAM (OUTPATIENT)
Dept: OBGYN CLINIC | Facility: CLINIC | Age: 58
End: 2023-10-11
Payer: COMMERCIAL

## 2023-10-11 VITALS
BODY MASS INDEX: 34.54 KG/M2 | WEIGHT: 233.2 LBS | HEIGHT: 69 IN | DIASTOLIC BLOOD PRESSURE: 80 MMHG | SYSTOLIC BLOOD PRESSURE: 126 MMHG

## 2023-10-11 DIAGNOSIS — Z12.31 SCREENING MAMMOGRAM, ENCOUNTER FOR: ICD-10-CM

## 2023-10-11 DIAGNOSIS — Z01.419 ENCOUNTER FOR ANNUAL ROUTINE GYNECOLOGICAL EXAMINATION: Primary | ICD-10-CM

## 2023-10-11 DIAGNOSIS — Z98.890 HISTORY OF SUBURETHRAL SLING PROCEDURE: ICD-10-CM

## 2023-10-11 DIAGNOSIS — N39.46 MIXED STRESS AND URGE URINARY INCONTINENCE: ICD-10-CM

## 2023-10-11 DIAGNOSIS — Z80.3 FAMILY HISTORY OF BREAST CANCER IN FEMALE: ICD-10-CM

## 2023-10-11 DIAGNOSIS — R92.30 DENSE BREASTS: ICD-10-CM

## 2023-10-11 DIAGNOSIS — R92.2 DENSE BREASTS: ICD-10-CM

## 2023-10-11 DIAGNOSIS — N60.12 BILATERAL FIBROCYSTIC BREAST CHANGES: ICD-10-CM

## 2023-10-11 DIAGNOSIS — N60.11 BILATERAL FIBROCYSTIC BREAST CHANGES: ICD-10-CM

## 2023-10-11 DIAGNOSIS — Z00.00 ENCOUNTER FOR WELLNESS EXAMINATION IN ADULT: ICD-10-CM

## 2023-10-11 PROCEDURE — G0476 HPV COMBO ASSAY CA SCREEN: HCPCS | Performed by: OBSTETRICS & GYNECOLOGY

## 2023-10-11 PROCEDURE — G0145 SCR C/V CYTO,THINLAYER,RESCR: HCPCS | Performed by: OBSTETRICS & GYNECOLOGY

## 2023-10-11 PROCEDURE — S0610 ANNUAL GYNECOLOGICAL EXAMINA: HCPCS | Performed by: OBSTETRICS & GYNECOLOGY

## 2023-10-11 NOTE — PROGRESS NOTES
Assessment/Plan:  Pap smear done as well as annual.  Encourage self breast examination as well as calcium supplementation. Continue annual mammogram.  Discussed automated breast ultrasound given risk factors for breast cancer. Await genetic testing panel. reviewed colon cancer screening, up-to-date. Will refer to urogynecology, mixed urinary incontinence, history of suburethral sling. She will continue to follow-up with primary care as scheduled. Return to office in 1 year or as needed  No problem-specific Assessment & Plan notes found for this encounter. Diagnoses and all orders for this visit:    Encounter for annual routine gynecological examination  -     Liquid-based pap, screening    Screening mammogram, encounter for  -     Mammo screening bilateral w 3d & cad; Future  -     US breast screening bilateral complete (ABUS); Future    Bilateral fibrocystic breast changes  -     US breast screening bilateral complete (ABUS); Future    Dense breasts  -     US breast screening bilateral complete (ABUS); Future    Family history of breast cancer in female  -     US breast screening bilateral complete (ABUS); Future    Encounter for wellness examination in adult  -     Ambulatory referral to Obstetrics / Gynecology    Mixed stress and urge urinary incontinence  -     Ambulatory Referral to Urogynecology; Future    History of suburethral sling procedure  -     Ambulatory Referral to Urogynecology; Future          Subjective:      Patient ID: Daja Do is a 60 y.o. female. HPI    This is a pleasant 62-year-old female P2 ( x2, age 26, 21) presents for her GYN exam.  She went through menopause at age 47. She is never been on hormone replacement therapy. She denies any vaginal bleeding or spotting. No changes in bowel or bladder function. She has been in a monogamous relationship for over 30 years. They are not sexually active.   Her Pap smears have been normal.  Last GYN exam was over 5 years ago. Past surgical history significant for suburethral sling placed at approximately age 35. She has noticed increased episodes of urinary incontinence more so associated with stress induced, occasionally urgency. She does get up in the middle the night to void once. There is been no changes in bowel habits. Colonoscopy 2018 with follow-up in 10 years. There is a family history of breast cancer. Patient underwent genetic testing last week, results pending. The following portions of the patient's history were reviewed and updated as appropriate: allergies, current medications, past family history, past medical history, past social history, past surgical history, and problem list.    Review of Systems   Constitutional:  Negative for fatigue, fever and unexpected weight change. Respiratory:  Negative for cough, chest tightness, shortness of breath and wheezing. Cardiovascular: Negative. Negative for chest pain and palpitations. Gastrointestinal: Negative. Negative for abdominal distention, abdominal pain, blood in stool, constipation, diarrhea, nausea and vomiting. Genitourinary: Negative. Negative for difficulty urinating, dyspareunia, dysuria, flank pain, frequency, genital sores, hematuria, pelvic pain, urgency, vaginal bleeding, vaginal discharge and vaginal pain. Skin:  Negative for rash. Objective:      /80   Ht 5' 9" (1.753 m)   Wt 106 kg (233 lb 3.2 oz)   BMI 34.44 kg/m²          Physical Exam  Constitutional:       Appearance: Normal appearance. She is well-developed. Cardiovascular:      Rate and Rhythm: Normal rate and regular rhythm. Pulmonary:      Effort: Pulmonary effort is normal.      Breath sounds: Normal breath sounds. Chest:   Breasts:     Right: No inverted nipple, mass, nipple discharge, skin change or tenderness. Left: No inverted nipple, mass, nipple discharge, skin change or tenderness.    Abdominal:      General: Bowel sounds are normal. There is no distension. Palpations: Abdomen is soft. Tenderness: There is no abdominal tenderness. There is no guarding or rebound. Genitourinary:     Labia:         Right: No rash, tenderness or lesion. Left: No rash, tenderness or lesion. Vagina: Normal. No signs of injury. No vaginal discharge or tenderness. Cervix: No cervical motion tenderness, discharge, friability or lesion. Uterus: Not enlarged and not tender. Adnexa:         Right: No mass, tenderness or fullness. Left: No mass, tenderness or fullness. Neurological:      Mental Status: She is alert and oriented to person, place, and time. External genitalia is within normal limits. The vagina is evident of slight estrogen deficiency. There is no pelvic floor prolapse. There is evidence of stress urinary incontinence with Valsalva x1.

## 2023-10-12 LAB
HPV HR 12 DNA CVX QL NAA+PROBE: NEGATIVE
HPV16 DNA CVX QL NAA+PROBE: NEGATIVE
HPV18 DNA CVX QL NAA+PROBE: NEGATIVE

## 2023-10-17 LAB
LAB AP GYN PRIMARY INTERPRETATION: NORMAL
Lab: NORMAL

## 2023-10-19 ENCOUNTER — TELEPHONE (OUTPATIENT)
Dept: GENETICS | Facility: CLINIC | Age: 58
End: 2023-10-19

## 2023-10-19 DIAGNOSIS — Z80.3 FH: BREAST CANCER: Primary | ICD-10-CM

## 2023-10-19 NOTE — TELEPHONE ENCOUNTER
Post-Test Genetic Counseling Consult Note    Today I left a voicemail for Tamika Graves reviewing the results of her genetic test for hereditary cancer. We met previously on 9/21/23 for pre-test counseling. I encouraged Tamika Graves to call [phone] to discuss this result further. A copy of this consult note and genetic test result will be shared with the patient. 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 The significance of the SDHA  variant is currently not known and therefore this test result cannot be used to help determine Ed Ramirez cancer risks. It is possible that the variant was seen in only a handful of individuals, or there may be conflicting or incomplete information in the medical literature about the variant and its association with hereditary cancer. The laboratory will continue to accumulate information on this variant and will reclassify it as either a positive or negative genetic test result when they are confident that they have adequate information. As updated information is obtained, we will notify Ed Ramirez with the updated information.  It is important to note that the majority of variants of uncertain significance are reclassified as likely benign or benign as additional information about the variant becomes available. Risk Based on Family History:  The significance of this variant is currently not known and therefore this test result cannot be used to help determine Enedina Ronquillo cancer risks. Rather her personal medical history and family history of cancer are the most important factors used to estimate her risk for developing certain cancers and to direct her medical management. Leslye Tirado risk of ovarian cancer is increased, based on the reported family history. As compared with the general population risk of approximately 1.5%, empiric data suggest that Leslye Tirado risk to develop ovarian cancer is approximately 3% given one second degree relative with ovarian cancer. It is important to note that this may be an overestimate of risk, as the BRCA1/2 status of the families used to generate this risk figure is unknown, [PMID: 2470304]       Leslye Tirado risk of breast cancer may still be increased based on her personal risk factors and family history. Despite a negative test result, we are able to run risk models to better estimate Leslye Tirado lifetime risk of developing breast cancer. I ran three risk models based on the information Enedina Ronquillo provided during our pre-test counseling session:    Tyrer-Stuzick model: Estimated lifetime risk, to age 80, for breast cancer is 17.3% compared to approximately 7.5% general population risk     Bev Brain model:Estimated lifetime risk, to age 80, for breast cancer is 21.9% compared to approximately 9.3% general population risk     Shiv tables: Estimated lifetime risk, to age 78, for breast cancer is 15.8%    The Bev Brain model predicts that Leslye Tirado lifetime risk for breast cancer is at least 20% therefore she qualifies for increased breast cancer screening, which is outlined below in the plan section.     Risks and Testing for Family Members:  Genetic testing for this variant is not recommended for relatives who wish to determine their cancer risks for purposes of determining medical management. The presence or absence of this variant in a relative is not clinically meaningful unless the variant is reclassified in the future. Despite this result, Bob Gomez first-degree relatives may be at increased risk for the cancers based on the family history. We recommend they discuss screening and management recommendations with their healthcare providers. If Mingo Alexander has any affected family members with a cancer diagnosis, especially at a young age, they may still consider genetic testing. Relatives who wish to pursue genetic testing can reach out to the [phone] ([phone]) to schedule an appointment or visit [email or web address] to identify a local genetic counselor. Additional Information:  A healthy lifestyle will improve overall health and reduce risk for illness. Eating a healthy diet and exercising for 4 hours per week is recommended. Both diet and exercise have been shown to help maintain a healthy weight. Postmenopausal women who are overweight are at higher risk for breast cancer. Moderate to heavy alcohol use can increase the risk for some cancers. Smoking cigarettes can also increase risk for breast, lung, prostate, pancreatic and other cancers. Plan:   Recommendations for Mingo Alexander are outlined below based on her family history, however the surveillance and medical management should continue as clinically indicated and as determined appropriate by her healthcare providers.     Breast cancer screening per the NCCN Breast Cancer Risk Reduction V.2023 and Breast Cancer Screening and Diagnosis Guideline v3.2023  Screening for women who have a lifetime risk of >20% as defined by models that are largely dependent on family history:  Breast awareness  Clinical encounter every 6-12 months   Consider referral to a breast specialist   Annual screening mammogram.  Dinah Reveles is recommended if available beginning 10 years prior to the youngest family member but not less than age 27 years  Annual breast MRI to begin 10 years prior to the youngest family member but not less than age 22 years  Consider whole breast ultrasound or contrast-enhanced mammography for those who qualify for but cannot undergo MRI     VUS Result: Alen Jon was strongly encouraged to contact us regarding any changes in her personal or family history of cancer as these changes could alter our recommendation regarding genetic testing and/or cancer screening. Alen Jon was also encouraged to follow up with us on an annual basis as variant classifications are subject to change.

## 2023-10-24 ENCOUNTER — TELEPHONE (OUTPATIENT)
Dept: OBGYN CLINIC | Facility: CLINIC | Age: 58
End: 2023-10-24

## 2023-10-27 ENCOUNTER — VBI (OUTPATIENT)
Dept: ADMINISTRATIVE | Facility: OTHER | Age: 58
End: 2023-10-27

## 2023-10-27 NOTE — TELEPHONE ENCOUNTER
10/27/23 1:44 PM    The patient was called and a message was left with the return number for Central Scheduling [phone]. Thank you.   Lottie Reyes  [facility]

## 2023-11-03 ENCOUNTER — OFFICE VISIT (OUTPATIENT)
Dept: BARIATRICS | Facility: CLINIC | Age: 58
End: 2023-11-03
Payer: COMMERCIAL

## 2023-11-03 VITALS
BODY MASS INDEX: 34.1 KG/M2 | DIASTOLIC BLOOD PRESSURE: 76 MMHG | SYSTOLIC BLOOD PRESSURE: 128 MMHG | HEART RATE: 78 BPM | HEIGHT: 69 IN | WEIGHT: 230.2 LBS | RESPIRATION RATE: 16 BRPM

## 2023-11-03 DIAGNOSIS — G47.33 OBSTRUCTIVE SLEEP APNEA SYNDROME: ICD-10-CM

## 2023-11-03 DIAGNOSIS — E78.49 OTHER HYPERLIPIDEMIA: Chronic | ICD-10-CM

## 2023-11-03 DIAGNOSIS — E66.09 CLASS 1 OBESITY DUE TO EXCESS CALORIES WITHOUT SERIOUS COMORBIDITY WITH BODY MASS INDEX (BMI) OF 33.0 TO 33.9 IN ADULT: Primary | ICD-10-CM

## 2023-11-03 PROBLEM — E66.811 CLASS 1 OBESITY DUE TO EXCESS CALORIES WITHOUT SERIOUS COMORBIDITY WITH BODY MASS INDEX (BMI) OF 33.0 TO 33.9 IN ADULT: Status: ACTIVE | Noted: 2023-07-08

## 2023-11-03 PROCEDURE — 99244 OFF/OP CNSLTJ NEW/EST MOD 40: CPT | Performed by: PHYSICIAN ASSISTANT

## 2023-11-03 RX ORDER — PHENTERMINE AND TOPIRAMATE 3.75; 23 MG/1; MG/1
1 CAPSULE, EXTENDED RELEASE ORAL DAILY
Qty: 30 CAPSULE | Refills: 1 | Status: SHIPPED | OUTPATIENT
Start: 2023-11-03

## 2023-11-03 NOTE — ASSESSMENT & PLAN NOTE
-Discussed options of HealthyCORE-Intensive Lifestyle Intervention Program, Very Low Calorie Diet-VLCD, and Conservative Program and the role of weight loss medications. Explained the importance of making lifestyle changes if utilizing medication to aid in weight loss  -Initial weight loss goal of 5-10% weight loss for improved health  -Screening labs and records reviewed from prior      -Patient is interested in pursuing conservative program now but would like to do healthycore in the future    Goals:  -Food log (ie.) [email or web address]  -recommend planning out afternoon snack and increasing protein with breakfast  - To drink at least 64oz of water daily. No sugary beverages. -recommend trying to get 1.5 hours of exercise consistently    Discussed medication option. To start on qsymia. EKG reviewed from prior. Side effects including elevated HR/BP, constipation, mood changes, insomnia, palpitations and tingling discussed. Denies glaucoma and nephrolithiasis.     Initial Weight:230  Goal Weight:160

## 2023-11-03 NOTE — PROGRESS NOTES
Assessment/Plan:    Class 1 obesity due to excess calories without serious comorbidity with body mass index (BMI) of 33.0 to 33.9 in adult  -Discussed options of HealthyCORE-Intensive Lifestyle Intervention Program, Very Low Calorie Diet-VLCD, and Conservative Program and the role of weight loss medications. Explained the importance of making lifestyle changes if utilizing medication to aid in weight loss  -Initial weight loss goal of 5-10% weight loss for improved health  -Screening labs and records reviewed from prior      -Patient is interested in pursuing conservative program now but would like to do healthycore in the future    Goals:  -Food log (ie.) [email or web address]  -recommend planning out afternoon snack and increasing protein with breakfast  - To drink at least 64oz of water daily. No sugary beverages. -recommend trying to get 1.5 hours of exercise consistently    Discussed medication option. To start on qsymia. EKG reviewed from prior. Side effects including elevated HR/BP, constipation, mood changes, insomnia, palpitations and tingling discussed. Denies glaucoma and nephrolithiasis. Initial Weight:230  Goal Weight:160        Other hyperlipidemia  Being monitored.    -should improve with weight loss, dietary, and lifestyle changes      Obstructive sleep apnea syndrome  Working with dentist to start oral device      Follow up in approximately  3 week nurse visit and 4 months   with Non-Surgical Physician/Advanced Practitioner.     I have spent a total time of 40 minutes on 11/03/23 in caring for this patient including Diagnostic results, Prognosis, Risks and benefits of tx options, Instructions for management, Patient and family education, Importance of tx compliance, Risk factor reductions, Impressions, Counseling / Coordination of care, Documenting in the medical record, Reviewing / ordering tests, medicine, procedures  , and Obtaining or reviewing history .    Diagnoses and all orders for this visit:    Class 1 obesity due to excess calories without serious comorbidity with body mass index (BMI) of 33.0 to 33.9 in adult  -     Phentermine-Topiramate (Qsymia) 3.75-23 MG CP24; Take 1 capsule by mouth in the morning    BMI 33.0-33.9,adult  -     Ambulatory referral to Weight Management    Other hyperlipidemia    Obstructive sleep apnea syndrome          Subjective:   Chief Complaint   Patient presents with    Consult     MWM- Consult, Goal Wt 160lb , Waist  46in, - Patient has sleep apnea       Patient ID: Suzette Becerra  is a 60 y.o. female with excess weight/obesity here to pursue weight management. Past Medical History:   Diagnosis Date    Bilateral fibrocystic breast changes     Costochondritis     Dense breasts     Headache     Hemorrhoids     Loss of height     Sinusitis     Urinary frequency     Wears glasses     Wears glasses        HPI: Here for MWM consult  She did [employer] before the covid pandemic and did well . She stopped [employer] and then changed her habits. When she saw her PCP in July she started to change around her diet. 7/5/23 weight was 249lb. She started to eat les junk food and stopping nighttime snacking. Sometimes falls off track because she feels hungry   But also thinks she maybe bored. Obesity/Excess Weight:  Severity:  class I with LETHA and HLD  Onset:  years    Modifiers: Diet and Exercise and Commercial Weight Loss Programs-ie. Weight Watchers, Sandy Mura, Nutrisystem, etc.  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   Working on oral device with dentist   Dining out/takeout:2 times         Diet Recall:  B (009): fruit  S: [employer]  L:Smart ones if hungry a  S:will do cheeze it or chips  D: protein, starch, vegetable   S: stopped      The following portions of the patient's history were reviewed and updated as appropriate: She  has a past medical history of Bilateral fibrocystic breast changes, Costochondritis, Dense breasts, Headache, Hemorrhoids, Loss of height, Sinusitis, Urinary frequency, Wears glasses, and Wears glasses. She   Patient Active Problem List    Diagnosis Date Noted    Obstructive sleep apnea syndrome 11/03/2023    Elevated bilirubin 07/12/2023    Class 1 obesity due to excess calories without serious comorbidity with body mass index (BMI) of 33.0 to 33.9 in adult 07/08/2023    Insomnia 07/08/2023    Sleep disorder breathing 07/08/2023    Closed fracture distal radius and ulna, left, initial encounter 03/06/2023    S/P ORIF (open reduction internal fixation) fracture 03/06/2023    Family history of ovarian carcinoma 03/07/2022    Skin pruritus 06/13/2021    Loss of height 09/14/2020    Family history of malignant melanoma 09/14/2020    Screening mammogram, encounter for 10/01/2018    Breast cancer screening other than mammogram 10/01/2018    Bilateral fibrocystic breast changes     Dense breasts     Family history of breast cancer in female     Other hyperlipidemia 09/21/2018    Vitamin D deficiency 09/21/2018     She  has a past surgical history that includes Incontinence surgery; Tubal ligation; Umbilical hernia repair; Colonoscopy; pr colonoscopy flx dx w/collj spec when pfrmd (N/A, 10/04/2018); and pr open treatment radial shaft fracture (Left, 02/22/2023). Her family history includes Alcohol abuse in her father; Anemia in her mother; Breast cancer in her mother and sister; Cancer in her mother; Coronary artery disease in her father; Leukemia in her father; Melanoma in her father and paternal uncle;  No Known Problems in her brother, daughter, daughter, maternal grandfather, maternal grandmother, paternal aunt, paternal aunt, paternal grandfather, paternal grandmother, and sister; Ovarian cancer (age of onset: 79) in her maternal aunt. She  reports that she has never smoked. She has never used smokeless tobacco. She reports current alcohol use of about 1.0 standard drink of alcohol per week. She reports that she does not use drugs.   Current Outpatient Medications   Medication Sig Dispense Refill    CALCIUM PO Take by mouth      Cholecalciferol (VITAMIN D3) 1000 units CAPS Take 2,000 Units by mouth daily        co-enzyme Q-10 30 MG capsule Take 30 mg by mouth 3 (three) times a day      Garlic 6477 MG CAPS Take 1,000 mg by mouth daily      multivitamin (THERAGRAN) TABS Take 1 tablet by mouth daily      Omega-3 Fatty Acids (fish oil) 1,000 mg Take 1,000 mg by mouth daily      Phentermine-Topiramate (Qsymia) 3.75-23 MG CP24 Take 1 capsule by mouth in the morning 30 capsule 1    traZODone (DESYREL) 50 mg tablet TAKE 1 TO 2 TABLETS BY MOUTH AT BEDTIME AS NEEDED FOR INSOMNIA 60 tablet 1     Current Facility-Administered Medications   Medication Dose Route Frequency Provider Last Rate Last Admin    lidocaine (XYLOCAINE) 0.5 % injection 0.5 mL  0.5 mL Infiltration  Renelda Quill, DPM   0.5 mL at 09/20/23 0800    triamcinolone acetonide (KENALOG-40) 40 mg/mL injection 40 mg  40 mg Infiltration  Renelda Quill, DPM   40 mg at 09/20/23 0800     Current Outpatient Medications on File Prior to Visit   Medication Sig    CALCIUM PO Take by mouth    Cholecalciferol (VITAMIN D3) 1000 units CAPS Take 2,000 Units by mouth daily      co-enzyme Q-10 30 MG capsule Take 30 mg by mouth 3 (three) times a day    Garlic 2623 MG CAPS Take 1,000 mg by mouth daily    multivitamin (THERAGRAN) TABS Take 1 tablet by mouth daily    Omega-3 Fatty Acids (fish oil) 1,000 mg Take 1,000 mg by mouth daily    traZODone (DESYREL) 50 mg tablet TAKE 1 TO 2 TABLETS BY MOUTH AT BEDTIME AS NEEDED FOR INSOMNIA     Current Facility-Administered Medications on File Prior to Visit   Medication    lidocaine (XYLOCAINE) 0.5 % injection 0.5 mL    triamcinolone acetonide (KENALOG-40) 40 mg/mL injection 40 mg     She has No Known Allergies. .    Review of Systems   Constitutional:  Negative for fever. Respiratory:  Negative for shortness of breath. Cardiovascular:  Negative for chest pain and palpitations. Gastrointestinal:  Negative for abdominal pain, constipation, diarrhea and vomiting. Genitourinary:  Negative for difficulty urinating. Skin:  Negative for rash. Neurological:  Negative for headaches. Psychiatric/Behavioral:  Negative for dysphoric mood. The patient is not nervous/anxious. Objective:    /76   Pulse 78   Resp 16   Ht 5' 9" (1.753 m)   Wt 104 kg (230 lb 3.2 oz)   BMI 33.99 kg/m²     Physical Exam  Vitals and nursing note reviewed. Constitutional:       General: She is not in acute distress. Appearance: She is well-developed. She is obese. HENT:      Head: Normocephalic and atraumatic. Eyes:      Conjunctiva/sclera: Conjunctivae normal.   Neck:      Thyroid: No thyromegaly. Pulmonary:      Effort: Pulmonary effort is normal. No respiratory distress. Skin:     Findings: No rash (visible). Neurological:      Mental Status: She is alert and oriented to person, place, and time.    Psychiatric:         Mood and Affect: Mood normal.         Behavior: Behavior normal.

## 2023-11-06 DIAGNOSIS — G47.00 INSOMNIA, UNSPECIFIED TYPE: ICD-10-CM

## 2023-11-06 RX ORDER — TRAZODONE HYDROCHLORIDE 50 MG/1
TABLET ORAL
Qty: 60 TABLET | Refills: 1 | Status: SHIPPED | OUTPATIENT
Start: 2023-11-06

## 2023-11-15 ENCOUNTER — TELEPHONE (OUTPATIENT)
Dept: BARIATRICS | Facility: CLINIC | Age: 58
End: 2023-11-15

## 2023-12-07 ENCOUNTER — VBI (OUTPATIENT)
Dept: ADMINISTRATIVE | Facility: OTHER | Age: 58
End: 2023-12-07

## 2024-01-01 DIAGNOSIS — G47.00 INSOMNIA, UNSPECIFIED TYPE: ICD-10-CM

## 2024-01-01 RX ORDER — TRAZODONE HYDROCHLORIDE 50 MG/1
TABLET ORAL
Qty: 60 TABLET | Refills: 1 | Status: SHIPPED | OUTPATIENT
Start: 2024-01-01

## 2024-02-21 PROBLEM — Z12.39 BREAST CANCER SCREENING OTHER THAN MAMMOGRAM: Status: RESOLVED | Noted: 2018-10-01 | Resolved: 2024-02-21

## 2024-03-03 DIAGNOSIS — G47.00 INSOMNIA, UNSPECIFIED TYPE: ICD-10-CM

## 2024-03-04 ENCOUNTER — OFFICE VISIT (OUTPATIENT)
Dept: OBGYN CLINIC | Facility: CLINIC | Age: 59
End: 2024-03-04
Payer: OTHER MISCELLANEOUS

## 2024-03-04 ENCOUNTER — HOSPITAL ENCOUNTER (OUTPATIENT)
Dept: RADIOLOGY | Facility: HOSPITAL | Age: 59
Discharge: HOME | End: 2024-03-04
Attending: STUDENT IN AN ORGANIZED HEALTH CARE EDUCATION/TRAINING PROGRAM
Payer: COMMERCIAL

## 2024-03-04 VITALS — BODY MASS INDEX: 34.07 KG/M2 | WEIGHT: 230 LBS | HEIGHT: 69 IN

## 2024-03-04 DIAGNOSIS — Z87.81 S/P ORIF (OPEN REDUCTION INTERNAL FIXATION) FRACTURE: Primary | ICD-10-CM

## 2024-03-04 DIAGNOSIS — S52.602A CLOSED FRACTURE DISTAL RADIUS AND ULNA, LEFT, INITIAL ENCOUNTER: ICD-10-CM

## 2024-03-04 DIAGNOSIS — S52.502A CLOSED FRACTURE DISTAL RADIUS AND ULNA, LEFT, INITIAL ENCOUNTER: ICD-10-CM

## 2024-03-04 DIAGNOSIS — Z98.890 S/P ORIF (OPEN REDUCTION INTERNAL FIXATION) FRACTURE: Primary | ICD-10-CM

## 2024-03-04 PROCEDURE — 73110 X-RAY EXAM OF WRIST: CPT

## 2024-03-04 PROCEDURE — 99214 OFFICE O/P EST MOD 30 MIN: CPT | Performed by: STUDENT IN AN ORGANIZED HEALTH CARE EDUCATION/TRAINING PROGRAM

## 2024-03-04 RX ORDER — TRAZODONE HYDROCHLORIDE 50 MG/1
TABLET ORAL
Qty: 60 TABLET | Refills: 3 | Status: SHIPPED | OUTPATIENT
Start: 2024-03-04

## 2024-03-04 NOTE — PROGRESS NOTES
ORTHOPAEDIC HAND, WRIST, AND ELBOW OFFICE  VISIT      ASSESSMENT/PLAN:      Diagnoses and all orders for this visit:    S/P ORIF (open reduction internal fixation) fracture  -     XR wrist 3+ vw left; Future          58 y.o. female appx 1 year s/p ORIF left distal radius, DOS 2/22/23    The patient is doing well. X-rays were reviewed in the office today which demonstrate healed fracture with stable hardware. She has no restrictions at this time. She may follow up with me as needed.      Follow Up:  PRN       To Do Next Visit:             Torsten Miller MD  Attending, Orthopaedic Surgery  Hand, Wrist, and Elbow Surgery  [facility]    ______________________________________________________________________________________________    CHIEF COMPLAINT:  Chief Complaint   Patient presents with   • Left Wrist - Follow-up       SUBJECTIVE:  Patient is a 58 y.o. RHD female who presents today for follow up of appx 1 year s/p ORIF left distal radius, DOS 2/22/23. The patient states she is doing well. She notes stiffness with wrist flexion. She is able to perform ADLs without any issues.       I have personally reviewed all the relevant PMH, PSH, SH, FH, Medications and allergies      PAST MEDICAL HISTORY:  Past Medical History:   Diagnosis Date   • Bilateral fibrocystic breast changes    • Costochondritis    • Dense breasts    • Headache    • Hemorrhoids    • Loss of height    • Sinusitis    • Urinary frequency    • Wears glasses    • Wears glasses        PAST SURGICAL HISTORY:  Past Surgical History:   Procedure Laterality Date   • COLONOSCOPY     • INCONTINENCE SURGERY      age 39, Dr Nye   • IN COLONOSCOPY FLX DX W/COLLJ SPEC WHEN PFRMD N/A 10/04/2018    Procedure: COLONOSCOPY;  Surgeon: Flavio Rouse MD;  [facility];  Service: Gastroenterology   • IN OPEN TREATMENT RADIAL SHAFT FRACTURE Left 02/22/2023    Procedure: OPEN REDUCTION W/ INTERNAL FIXATION (ORIF) RADIUS;  Surgeon: Torsten  MD Paul;  Location: [facility];  Service: Orthopedics   • TUBAL LIGATION     • UMBILICAL HERNIA REPAIR         FAMILY HISTORY:  Family History   Problem Relation Age of Onset   • Anemia Mother    • Breast cancer Mother         AGE DX UNK   • Cancer Mother    • Coronary artery disease Father    • Melanoma Father         malignant of skin AGE DX UNK   • Leukemia Father         AGE DX UNK   • Alcohol abuse Father    • Breast cancer Sister         AGE DX UNK   • No Known Problems Daughter    • No Known Problems Maternal Grandmother    • No Known Problems Maternal Grandfather    • No Known Problems Paternal Grandmother    • No Known Problems Paternal Grandfather    • No Known Problems Sister    • No Known Problems Brother    • No Known Problems Daughter    • Ovarian cancer Maternal Aunt 70   • Melanoma Paternal Uncle    • No Known Problems Paternal Aunt    • No Known Problems Paternal Aunt        SOCIAL HISTORY:  Social History     Tobacco Use   • Smoking status: Never   • Smokeless tobacco: Never   Vaping Use   • Vaping status: Never Used   Substance Use Topics   • Alcohol use: Yes     Alcohol/week: 1.0 standard drink of alcohol     Types: 1 Glasses of wine per week     Comment: Socially Weekends   • Drug use: Never       MEDICATIONS:    Current Outpatient Medications:   •  CALCIUM PO, Take by mouth, Disp: , Rfl:   •  Cholecalciferol (VITAMIN D3) 1000 units CAPS, Take 2,000 Units by mouth daily  , Disp: , Rfl:   •  co-enzyme Q-10 30 MG capsule, Take 30 mg by mouth 3 (three) times a day, Disp: , Rfl:   •  Garlic 1000 MG CAPS, Take 1,000 mg by mouth daily, Disp: , Rfl:   •  multivitamin (THERAGRAN) TABS, Take 1 tablet by mouth daily, Disp: , Rfl:   •  Omega-3 Fatty Acids (fish oil) 1,000 mg, Take 1,000 mg by mouth daily, Disp: , Rfl:   •  Phentermine-Topiramate (Qsymia) 3.75-23 MG CP24, Take 1 capsule by mouth in the morning, Disp: 30 capsule, Rfl: 1  •  traZODone (DESYREL) 50 mg tablet, TAKE 1 TO 2 TABLETS BY  MOUTH AT BEDTIME AS NEEDED FOR INSOMNIA, Disp: 60 tablet, Rfl: 3    Current Facility-Administered Medications:   •  lidocaine (XYLOCAINE) 0.5 % injection 0.5 mL, 0.5 mL, Infiltration, , Jorge Bronfenbrenner, DPM, 0.5 mL at 09/20/23 0800  •  triamcinolone acetonide (KENALOG-40) 40 mg/mL injection 40 mg, 40 mg, Infiltration, , Jorge Bronfenbrenner, DPM, 40 mg at 09/20/23 0800    ALLERGIES:  No Known Allergies        REVIEW OF SYSTEMS:  Musculoskeletal:        As noted in HPI.   All other systems reviewed and are negative.    VITALS:  There were no vitals filed for this visit.    LABS:  HgA1c:   Lab Results   Component Value Date    HGBA1C 5.8 (H) 07/07/2023     BMP:   Lab Results   Component Value Date    CALCIUM 9.2 07/07/2023    K 3.9 07/07/2023    CO2 25 07/07/2023     07/07/2023    BUN 15 07/07/2023    CREATININE 0.68 07/07/2023       _____________________________________________________  PHYSICAL EXAMINATION:  General: Well developed and well nourished, alert & oriented x 3, appears comfortable  Psychiatric: Normal  HEENT: Normocephalic, Atraumatic Trachea Midline, No torticollis  Pulmonary: No audible wheezing or respiratory distress   Abdomen/GI: Non tender, non distended   Cardiovascular: No pitting edema, 2+ radial pulse   Skin: No masses, erythema, lacerations, fluctation, ulcerations  Neurovascular: Sensation Intact to the Median, Ulnar, Radial Nerve, Motor Intact to the Median, Ulnar, Radial Nerve, and Pulses Intact  Musculoskeletal: Normal, except as noted in detailed exam and in HPI.      MUSCULOSKELETAL EXAMINATION:  Left wrist   Flexion 45  Extension 65  Full pronation and supination   Full fist  Scar well healed   ___________________________________________________  STUDIES REVIEWED:  Xrays of the left wrist were reviewed and independently interpreted in PACS by Dr. Miller and demonstrate healed distal radius fracture with stable hardware.          PROCEDURES PERFORMED:  Procedures  No  Procedures performed today    _____________________________________________________      Scribe Attestation    I,:  Marianne Hess MA am acting as a scribe while in the presence of the attending physician.:       I,:  Torsten Miller MD personally performed the services described in this documentation    as scribed in my presence.:

## 2024-03-18 ENCOUNTER — HOSPITAL ENCOUNTER (EMERGENCY)
Facility: HOSPITAL | Age: 59
Discharge: HOME | End: 2024-03-18
Attending: EMERGENCY MEDICINE
Payer: COMMERCIAL

## 2024-03-18 ENCOUNTER — APPOINTMENT (EMERGENCY)
Dept: RADIOLOGY | Facility: HOSPITAL | Age: 59
End: 2024-03-18
Payer: COMMERCIAL

## 2024-03-18 ENCOUNTER — OFFICE VISIT (OUTPATIENT)
Dept: OBGYN CLINIC | Facility: HOSPITAL | Age: 59
End: 2024-03-18
Payer: COMMERCIAL

## 2024-03-18 VITALS
TEMPERATURE: 97.8 F | HEART RATE: 74 BPM | OXYGEN SATURATION: 98 % | RESPIRATION RATE: 18 BRPM | DIASTOLIC BLOOD PRESSURE: 63 MMHG | SYSTOLIC BLOOD PRESSURE: 139 MMHG

## 2024-03-18 VITALS
HEIGHT: 69 IN | WEIGHT: 230 LBS | DIASTOLIC BLOOD PRESSURE: 74 MMHG | HEART RATE: 58 BPM | SYSTOLIC BLOOD PRESSURE: 136 MMHG | BODY MASS INDEX: 34.07 KG/M2

## 2024-03-18 DIAGNOSIS — M25.561 ACUTE PAIN OF RIGHT KNEE: Primary | ICD-10-CM

## 2024-03-18 DIAGNOSIS — M23.91 INTERNAL DERANGEMENT OF RIGHT KNEE: Primary | ICD-10-CM

## 2024-03-18 PROCEDURE — 99284 EMERGENCY DEPT VISIT MOD MDM: CPT | Performed by: EMERGENCY MEDICINE

## 2024-03-18 PROCEDURE — 99283 EMERGENCY DEPT VISIT LOW MDM: CPT

## 2024-03-18 PROCEDURE — 73564 X-RAY EXAM KNEE 4 OR MORE: CPT

## 2024-03-18 PROCEDURE — 20610 DRAIN/INJ JOINT/BURSA W/O US: CPT | Performed by: STUDENT IN AN ORGANIZED HEALTH CARE EDUCATION/TRAINING PROGRAM

## 2024-03-18 PROCEDURE — 99214 OFFICE O/P EST MOD 30 MIN: CPT | Performed by: STUDENT IN AN ORGANIZED HEALTH CARE EDUCATION/TRAINING PROGRAM

## 2024-03-18 RX ORDER — LIDOCAINE HYDROCHLORIDE 10 MG/ML
1 INJECTION, SOLUTION EPIDURAL; INFILTRATION; INTRACAUDAL; PERINEURAL
Status: COMPLETED | OUTPATIENT
Start: 2024-03-18 | End: 2024-03-18

## 2024-03-18 RX ORDER — BETAMETHASONE SODIUM PHOSPHATE AND BETAMETHASONE ACETATE 3; 3 MG/ML; MG/ML
12 INJECTION, SUSPENSION INTRA-ARTICULAR; INTRALESIONAL; INTRAMUSCULAR; SOFT TISSUE
Status: COMPLETED | OUTPATIENT
Start: 2024-03-18 | End: 2024-03-18

## 2024-03-18 RX ORDER — BUPIVACAINE HYDROCHLORIDE 2.5 MG/ML
1 INJECTION, SOLUTION INFILTRATION; PERINEURAL
Status: COMPLETED | OUTPATIENT
Start: 2024-03-18 | End: 2024-03-18

## 2024-03-18 RX ORDER — NABUMETONE 750 MG/1
750 TABLET, FILM COATED ORAL 2 TIMES DAILY
Qty: 60 TABLET | Refills: 0 | Status: SHIPPED | OUTPATIENT
Start: 2024-03-18 | End: 2024-04-17

## 2024-03-18 RX ADMIN — LIDOCAINE HYDROCHLORIDE 1 ML: 10 INJECTION, SOLUTION EPIDURAL; INFILTRATION; INTRACAUDAL; PERINEURAL at 15:15

## 2024-03-18 RX ADMIN — BUPIVACAINE HYDROCHLORIDE 1 ML: 2.5 INJECTION, SOLUTION INFILTRATION; PERINEURAL at 15:15

## 2024-03-18 RX ADMIN — BETAMETHASONE SODIUM PHOSPHATE AND BETAMETHASONE ACETATE 12 MG: 3; 3 INJECTION, SUSPENSION INTRA-ARTICULAR; INTRALESIONAL; INTRAMUSCULAR; SOFT TISSUE at 15:15

## 2024-03-18 NOTE — ED ATTENDING ATTESTATION
3/18/2024  I, Chante Mishra DO, saw and evaluated the patient. I have discussed the patient with the resident/non-physician practitioner and agree with the resident's/non-physician practitioner's findings, Plan of Care, and MDM as documented in the resident's/non-physician practitioner's note, except where noted. All available labs and Radiology studies were reviewed.  I was present for key portions of any procedure(s) performed by the resident/non-physician practitioner and I was immediately available to provide assistance.       At this point I agree with the current assessment done in the Emergency Department.  I have conducted an independent evaluation of this patient a history and physical is as follows:    58-year-old female presents with right knee pain.  Patient states Friday she was walking on her lunch break and it started to rain so she ran to get back inside.  Patient states had slight right knee pain which continued Saturday and now pain has been worsening.  She is able to walk but it does hurt.  Denies other complaints.  No fall, and no specific trauma.  On exam-no acute distress, heart regular, no respiratory distress, tenderness on the right knee worse in the medial distal aspect, no swelling, no effusion, no erythema or warmth to touch.  Plan-suspect musculoskeletal pain, will do x-ray and if normal follow-up Ortho    ED Course         Critical Care Time  Procedures

## 2024-03-18 NOTE — DISCHARGE INSTRUCTIONS
Please use Tylenol, Motrin, ice, and elevate your leg for pain relief.  Please follow-up with orthopedic surgery if your pain persists.  Please return to the emergency department with any new or concerning symptoms including increasing pain, inability to walk, or high fevers.

## 2024-03-18 NOTE — ED PROVIDER NOTES
History  Chief Complaint   Patient presents with    Knee Pain     Patient reports doing something to knee and does not know what, Friday did walking and a little bit of running doesn't normally run. Saturday hurt a little had been driving. Yesterday became increasingly more painful. Denies any falls/trauma      Patient is a 58-year-old female who presents with knee pain.  Patient states that on Friday she was walking on her lunch break when it began to rain so she ran to get inside.  She says that that day she started to feel some slight right knee pain which continued into Saturday.  She says that yesterday the pain severely worsened that last night it became unbearable so she presented today to be evaluated.  She denies any specific trauma.  She has been using ice, heat, and Tylenol for pain relief.  She denies any fevers, chills, nausea, vomiting, chest pain, shortness breath, abdominal pain, nausea, vomiting, dysuria, hematuria, diarrhea.        Prior to Admission Medications   Prescriptions Last Dose Informant Patient Reported? Taking?   CALCIUM PO  Self Yes No   Sig: Take by mouth   Cholecalciferol (VITAMIN D3) 1000 units CAPS  Self Yes No   Sig: Take 2,000 Units by mouth daily     Garlic 1000 MG CAPS  Self Yes No   Sig: Take 1,000 mg by mouth daily   Omega-3 Fatty Acids (fish oil) 1,000 mg  Self Yes No   Sig: Take 1,000 mg by mouth daily   Phentermine-Topiramate (Qsymia) 3.75-23 MG CP24  Self No No   Sig: Take 1 capsule by mouth in the morning   co-enzyme Q-10 30 MG capsule  Self Yes No   Sig: Take 30 mg by mouth 3 (three) times a day   multivitamin (THERAGRAN) TABS  Self Yes No   Sig: Take 1 tablet by mouth daily   traZODone (DESYREL) 50 mg tablet  Self No No   Sig: TAKE 1 TO 2 TABLETS BY MOUTH AT BEDTIME AS NEEDED FOR INSOMNIA      Facility-Administered Medications Last Administration Doses Remaining   lidocaine (XYLOCAINE) 0.5 % injection 0.5 mL 9/20/2023  8:00 AM    triamcinolone acetonide (KENALOG-40) 40  mg/mL injection 40 mg 9/20/2023  8:00 AM           Past Medical History:   Diagnosis Date    Bilateral fibrocystic breast changes     Costochondritis     Dense breasts     Headache     Hemorrhoids     Loss of height     Sinusitis     Urinary frequency     Wears glasses     Wears glasses        Past Surgical History:   Procedure Laterality Date    COLONOSCOPY      INCONTINENCE SURGERY      age 39, Dr Nye    MT COLONOSCOPY FLX DX W/COLLJ SPEC WHEN PFRMD N/A 10/04/2018    Procedure: COLONOSCOPY;  Surgeon: Flavio Rouse MD;  [facility];  Service: Gastroenterology    MT OPEN TREATMENT RADIAL SHAFT FRACTURE Left 02/22/2023    Procedure: OPEN REDUCTION W/ INTERNAL FIXATION (ORIF) RADIUS;  Surgeon: Torsten Mliler MD;  Location: [facility] MA[facility] OR;  Service: Orthopedics    TUBAL LIGATION      UMBILICAL HERNIA REPAIR         Family History   Problem Relation Age of Onset    Anemia Mother     Breast cancer Mother         AGE DX UNK    Cancer Mother     Coronary artery disease Father     Melanoma Father         malignant of skin AGE DX UNK    Leukemia Father         AGE DX UNK    Alcohol abuse Father     Breast cancer Sister         AGE DX UNK    No Known Problems Daughter     No Known Problems Maternal Grandmother     No Known Problems Maternal Grandfather     No Known Problems Paternal Grandmother     No Known Problems Paternal Grandfather     No Known Problems Sister     No Known Problems Brother     No Known Problems Daughter     Ovarian cancer Maternal Aunt 70    Melanoma Paternal Uncle     No Known Problems Paternal Aunt     No Known Problems Paternal Aunt      I have reviewed and agree with the history as documented.    E-Cigarette/Vaping    E-Cigarette Use Never User      E-Cigarette/Vaping Substances    Nicotine No     Flavoring No      Social History     Tobacco Use    Smoking status: Never    Smokeless tobacco: Never   Vaping Use    Vaping status: Never Used   Substance Use Topics    Alcohol use: Yes      Alcohol/week: 1.0 standard drink of alcohol     Types: 1 Glasses of wine per week     Comment: Socially Weekends    Drug use: Never        Review of Systems   Constitutional:  Negative for chills and fever.   HENT:  Negative for congestion, rhinorrhea and sore throat.    Eyes:  Negative for pain and visual disturbance.   Respiratory:  Negative for cough and shortness of breath.    Cardiovascular:  Negative for chest pain and palpitations.   Gastrointestinal:  Negative for abdominal pain, constipation, diarrhea, nausea and vomiting.   Genitourinary:  Negative for dysuria and hematuria.   Musculoskeletal:  Positive for arthralgias. Negative for back pain and neck pain.   Skin:  Negative for color change and rash.   Neurological:  Negative for weakness and numbness.   All other systems reviewed and are negative.      Physical Exam  ED Triage Vitals   Temperature Pulse Respirations Blood Pressure SpO2   03/18/24 0658 03/18/24 0658 03/18/24 0658 03/18/24 0659 03/18/24 0658   97.8 °F (36.6 °C) 74 18 139/63 98 %      Temp src Heart Rate Source Patient Position - Orthostatic VS BP Location FiO2 (%)   -- 03/18/24 0658 -- -- --    Monitor         Pain Score       --                    Orthostatic Vital Signs  Vitals:    03/18/24 0658 03/18/24 0659   BP:  139/63   Pulse: 74        Physical Exam  Vitals and nursing note reviewed.   Constitutional:       General: She is not in acute distress.     Appearance: Normal appearance. She is well-developed. She is obese. She is not ill-appearing, toxic-appearing or diaphoretic.   HENT:      Head: Normocephalic and atraumatic.      Right Ear: External ear normal.      Left Ear: External ear normal.      Nose: Nose normal. No congestion or rhinorrhea.      Mouth/Throat:      Mouth: Mucous membranes are moist.      Pharynx: Oropharynx is clear. No oropharyngeal exudate or posterior oropharyngeal erythema.   Eyes:      General: No scleral icterus.        Right eye: No discharge.          Left eye: No discharge.      Extraocular Movements: Extraocular movements intact.      Conjunctiva/sclera: Conjunctivae normal.      Pupils: Pupils are equal, round, and reactive to light.   Cardiovascular:      Rate and Rhythm: Normal rate and regular rhythm.      Pulses: Normal pulses.      Heart sounds: Normal heart sounds.   Pulmonary:      Effort: Pulmonary effort is normal.      Breath sounds: Normal breath sounds.   Abdominal:      General: Abdomen is flat. Bowel sounds are normal.      Palpations: Abdomen is soft.   Musculoskeletal:         General: Tenderness present. No swelling, deformity or signs of injury. Normal range of motion.      Cervical back: Neck supple.      Right lower leg: No edema.      Left lower leg: No edema.      Comments: There is pain to palpation of the inferomedial aspect of the right knee.  There is no obvious joint effusion.  There is no overlying or erythema.  Range of motion intact.  Strength intact.  Lachman's test, anterior and posterior drawer tests negative.   Skin:     General: Skin is warm and dry.      Capillary Refill: Capillary refill takes less than 2 seconds.      Coloration: Skin is not jaundiced or pale.   Neurological:      General: No focal deficit present.      Mental Status: She is alert and oriented to person, place, and time.      Cranial Nerves: No cranial nerve deficit.      Sensory: No sensory deficit.      Motor: No weakness.   Psychiatric:         Mood and Affect: Mood normal.         Behavior: Behavior normal.         ED Medications  Medications - No data to display    Diagnostic Studies  Results Reviewed       None                   XR knee 4+ vw right injury   Final Result by Facundo Marcelo MD (03/18 8422)      Mild right knee osteoarthritis      No acute osseous abnormality or joint effusion            Workstation performed: [id number]               Procedures  Procedures      ED Course                                       Medical Decision  Making  Patient is a 58-year-old female who presents with knee pain.    DDx includes not limited to knee fracture, dislocation, ligamentous injury, strain.  Patient with mild inferomedial knee tenderness on the right.  She is able to bear some weight with some difficulty.  Will obtain x-ray of the knee.    On my interpretation, patient's x-ray shows no acute osseous abnormalities.  Will provide Ace wrap, advised Motrin and Tylenol at home as well as ice and elevation.  Advise follow-up with orthopedic surgery if her symptoms do not improve.  Return precautions given, all questions answered.    Amount and/or Complexity of Data Reviewed  Radiology: ordered.          Disposition  Final diagnoses:   Acute pain of right knee     Time reflects when diagnosis was documented in both MDM as applicable and the Disposition within this note       Time User Action Codes Description Comment    3/18/2024  5:21 PM Neil Jeffries Add [M25.561] Acute pain of right knee           ED Disposition       ED Disposition   Discharge    Condition   Stable    Date/Time   Mon Mar 18, 2024  7:15 AM    Comment   Guerita VARELA [place] discharge to home/self care.                   Follow-up Information       Follow up With Specialties Details Why Contact Info Additional Information    Bozena Caballero MD Family Medicine   [address] [place] [id number]  [phone]       [facility] [place] [facility] Call today To be re-evaluated for your symptoms, As needed [address] [facility] [address] [phone] [facility] [place], [address], [address]   [phone]  Use Entrance A             Discharge Medication List as of 3/18/2024  7:40 AM        CONTINUE these medications which have NOT CHANGED    Details   CALCIUM PO Take by mouth, Historical Med      Cholecalciferol (VITAMIN D3) 1000 units CAPS Take 2,000 Units by mouth  daily  , Historical Med      co-enzyme Q-10 30 MG capsule Take 30 mg by mouth 3 (three) times a day, Historical Med      Garlic 1000 MG CAPS Take 1,000 mg by mouth daily, Historical Med      multivitamin (THERAGRAN) TABS Take 1 tablet by mouth daily, Historical Med      Omega-3 Fatty Acids (fish oil) 1,000 mg Take 1,000 mg by mouth daily, Historical Med      Phentermine-Topiramate (Qsymia) 3.75-23 MG CP24 Take 1 capsule by mouth in the morning, Starting Fri 11/3/2023, Normal      traZODone (DESYREL) 50 mg tablet TAKE 1 TO 2 TABLETS BY MOUTH AT BEDTIME AS NEEDED FOR INSOMNIA, Normal           No discharge procedures on file.    PDMP Review         Value Time User    PDMP Reviewed  Yes 11/3/2023 11:05 AM Jay Muñoz PA-C             ED Provider  Attending physically available and evaluated Gueirta Garcia. I managed the patient along with the ED Attending.    Electronically Signed by           Neil Jeffries MD  03/18/24 1002       Neil Jeffries MD  03/19/24 0863

## 2024-03-19 NOTE — ASSESSMENT & PLAN NOTE
-WBAT  -Activity modification to limit strain or impact on the joint  -nabumetone (Relafen) as needed  -Tylenol 1000mg up to three times daily as needed. Do not exceed 3000mg daily  -Supervised physical therapy. Script provided   -Home exercise program directed by PT  -Knee sleeve or brace for comfort  -Corticosteroid injection was offered, accepted, and administered.  Risk benefits and alternatives were discussed prior to injection.  Patient tolerated the procedure well.  -Patient may follow up prn for further evaluation and treatment    -Patient informed that if the above treatment modalities did not provide durable pain relief, we would likely pursue MRI evaluation

## 2024-03-19 NOTE — PROGRESS NOTES
Date: 24  Guerita Garcia   MRN# [id number]  : 1965      Chief Complaint: Right knee pain    Assessment and Plan:    Internal derangement of right knee  -WBAT  -Activity modification to limit strain or impact on the joint  -nabumetone (Relafen) as needed  -Tylenol 1000mg up to three times daily as needed. Do not exceed 3000mg daily  -Supervised physical therapy. Script provided   -Home exercise program directed by PT  -Knee sleeve or brace for comfort  -Corticosteroid injection was offered, accepted, and administered.  Risk benefits and alternatives were discussed prior to injection.  Patient tolerated the procedure well.  -Patient may follow up prn for further evaluation and treatment    -Patient informed that if the above treatment modalities did not provide durable pain relief, we would likely pursue MRI evaluation     Subjective:     Knee Pain  Patient complains of right knee pain. This is evaluated as a personal injury. The pain began yesterday evening and into this morning.  She reports going for a walk yesterday, finishing with a dog in order to avoid the rain.  This is when the knee pain began.  The pain is located medial.  She describes the symptoms as aching, stabbing, and throbbing. Symptoms improve with rest, avoiding painful activities. The symptoms are worse with deep knee bending, getting up from a chair, weight bearing. The knee has not given out or felt unstable. The patient cannot bend and straighten the knee fully. Treatment to date has been ice, heat, Tylenol, knee sleeve/brace, without significant relief.    External Records Reviewed: ER records and x-ray reports    Allergy:  No Known Allergies  Medications:  all current active meds have been reviewed  Past Medical History:  Past Medical History:   Diagnosis Date    Bilateral fibrocystic breast changes     Costochondritis     Dense breasts     Headache     Hemorrhoids     Loss of height     Sinusitis     Urinary frequency      Wears glasses     Wears glasses      Past Surgical History:  Past Surgical History:   Procedure Laterality Date    COLONOSCOPY      INCONTINENCE SURGERY      age 39, Dr Nye    MS COLONOSCOPY FLX DX W/COLLJ SPEC WHEN PFRMD N/A 10/04/2018    Procedure: COLONOSCOPY;  Surgeon: Flavio Rouse MD;  [facility];  Service: Gastroenterology    MS OPEN TREATMENT RADIAL SHAFT FRACTURE Left 02/22/2023    Procedure: OPEN REDUCTION W/ INTERNAL FIXATION (ORIF) RADIUS;  Surgeon: Torsten Miller MD;  Location: [facility] MAIN OR;  Service: Orthopedics    TUBAL LIGATION      UMBILICAL HERNIA REPAIR       Family History:  Family History   Problem Relation Age of Onset    Anemia Mother     Breast cancer Mother         AGE DX UNK    Cancer Mother     Coronary artery disease Father     Melanoma Father         malignant of skin AGE DX UNK    Leukemia Father         AGE DX UNK    Alcohol abuse Father     Breast cancer Sister         AGE DX UNK    No Known Problems Daughter     No Known Problems Maternal Grandmother     No Known Problems Maternal Grandfather     No Known Problems Paternal Grandmother     No Known Problems Paternal Grandfather     No Known Problems Sister     No Known Problems Brother     No Known Problems Daughter     Ovarian cancer Maternal Aunt 70    Melanoma Paternal Uncle     No Known Problems Paternal Aunt     No Known Problems Paternal Aunt      Social History:  Social History     Substance and Sexual Activity   Alcohol Use Yes    Alcohol/week: 1.0 standard drink of alcohol    Types: 1 Glasses of wine per week    Comment: Socially Weekends     Social History     Substance and Sexual Activity   Drug Use Never     Social History     Tobacco Use   Smoking Status Never   Smokeless Tobacco Never           ROS:   Review of Systems   All other systems reviewed and are negative.       Objective:   BP Readings from Last 1 Encounters:   03/18/24 136/74      Wt Readings from Last 1 Encounters:   03/18/24 104 kg (230  "lb)      Pulse Readings from Last 1 Encounters:   03/18/24 58      BMI: Estimated body mass index is 33.97 kg/m² as calculated from the following:    Height as of this encounter: 5' 9\" (1.753 m).    Weight as of this encounter: 104 kg (230 lb).        Physical Exam  Constitutional:       General: She is not in acute distress.  HENT:      Head: Normocephalic and atraumatic.   Eyes:      Conjunctiva/sclera: Conjunctivae normal.   Cardiovascular:      Comments: Extremities well perfused   No LE edema    Pulmonary:      Effort: Pulmonary effort is normal.   Abdominal:      General: Abdomen is flat. Bowel sounds are normal.   Neurological:      Mental Status: She is alert. Mental status is at baseline.          Gait and Station:   antalgic    Right knee:     Inspection:  normal color, temperature, turgor and moisture    Overall limb alignment: neutral    Effusion: minimal    ROM 0 to 110 with pain    Extensor Lag: Absent    Palpation: Medial joint line tenderness to palpation    stable to AP translation at 90 deg    Coronal plane stable in full extension    Coronal plane stable in mid-flexion     Motor: 5/5 EHL/FHL/TA/GS/Qd/Hs    Vascular: Toes WWP with BCR    Sensory: SILT DP/SP/Ronaldo/Saph/Tib    Images:    I personally reviewed relevant images in the PACS system and my interpretation is as follows:  Nonweightbearing x-rays of the right knee reveal mild degenerative changes with joint space narrowing    Large joint arthrocentesis: R knee  Universal Protocol:  Consent: Verbal consent obtained.  Site marked: the operative site was marked  Supporting Documentation  Indications: pain and diagnostic evaluation   Procedure Details  Location: knee - R knee  Preparation: Patient was prepped and draped in the usual sterile fashion  Needle size: 22 G  Ultrasound guidance: no  Approach: anterolateral  Medications administered: 1 mL bupivacaine 0.25 %; 12 mg betamethasone acetate-betamethasone sodium phosphate 6 (3-3) mg/mL; 1 mL " lidocaine (PF) 1 %    Patient tolerance: patient tolerated the procedure well with no immediate complications  Dressing:  Sterile dressing applied           Candido Kramer MD  Adult Reconstruction Specialist   [facility]

## 2024-03-27 ENCOUNTER — EVALUATION (OUTPATIENT)
Dept: PHYSICAL THERAPY | Facility: CLINIC | Age: 59
End: 2024-03-27
Payer: COMMERCIAL

## 2024-03-27 DIAGNOSIS — M23.91 INTERNAL DERANGEMENT OF RIGHT KNEE: ICD-10-CM

## 2024-03-27 PROCEDURE — 97161 PT EVAL LOW COMPLEX 20 MIN: CPT | Performed by: PHYSICAL THERAPIST

## 2024-03-27 NOTE — PROGRESS NOTES
PT Evaluation     Today's date: 3/27/2024  Patient name: Guerita Garcia  : 1965  MRN: [id number]  Referring provider: Candido Kramer MD  Dx:   Encounter Diagnosis     ICD-10-CM    1. Internal derangement of right knee  M23.91 Ambulatory Referral to Physical Therapy                     Assessment  Assessment details: Pt is a 58 y.o. female presenting to PT with acute R knee pain. PT findings include: limited knee flex/ext ROM secondary to pain, strength deficits of knee flex/ext, proximal LE strength deficits and pain recreation to palpation of the R medial aspect of knee. As pt with no significant visible swelling (no effusion), sharp pain, even with nonweightbearing knee flex/ext and pain to palpation of pes anserine, likely involving pes anserine bursa. Cannot rule out meniscus involvement. Pt educated on PT findings, anatomy, biomechanics, POC and rehab course. Pt will benefit from skilled PT to address above impairments to return to PLOF with less restriction and to reach functional goals.   Impairments: abnormal gait, abnormal or restricted ROM, impaired physical strength, lacks appropriate home exercise program, pain with function and weight-bearing intolerance    Symptom irritability: lowUnderstanding of Dx/Px/POC: good   Prognosis: good    Goals  1. Pt will demonstrate understanding of HEP and POC in order to improve compliance with course of rehab in 2 weeks.  2. Pt will regain knee flex/ext ROM in order for pt to ambulate with less compensatory movement in 3 weeks.   3. Pt's knee ext/flex MMT will improve to 5/5 to improve dynamic stability with WB by d/c.   4. Pt's pain will be 2/10 or less to allow pt to return to PLOF with least restriction by d/c.     Plan  Patient would benefit from: skilled physical therapy  Planned modality interventions: low level laser therapy  Planned therapy interventions: joint mobilization, manual therapy, neuromuscular re-education, patient education,  postural training, strengthening, stretching, therapeutic activities, therapeutic exercise, home exercise program, flexibility and functional ROM exercises  Frequency: 2x week  Duration in weeks: 6        Subjective Evaluation    History of Present Illness  Mechanism of injury: Pt arrives to PT via referral from orthopedic surgery. Pt went for walk and did a little bit of jogging, stopped secondary to knee starting to hurt. Injury occurred 3/15 and secondary to pain that was significant, went to ED. Susequently consulted with orthopedic surgery, she was offered and accepted injection to the knee.     Pt states that the injection made no significant difference. When she takes tylenol and advil, it helps with her pain. Pt states that pain limits her ability to walk. She denies clicking/locking/ mechanical symptoms. Pain is described as sharp.     Quality of life: good    Patient Goals  Patient goals for therapy: decreased pain, increased motion, increased strength and independence with ADLs/IADLs    Pain  Current pain ratin  At best pain ratin  At worst pain ratin  Location: R medial knee.  Quality: sharp, dull ache and discomfort  Relieving factors: medications, relaxation and rest          Objective    Knee ROM:  Flex: 97° (empty end feel, painful)  Ext: lacking 5° (empty end feel, painful)    Strength:  Knee ext: 3/5 (pain limited)  Knee flex: 3/5 (pain limited)  Hip abduction: 3+/5    Palpation: tenderness grossly at anterior knee and medial knee. Pain to palpation of pes anserine. Slight pain to distal semitendinosus, gracilis.     Gait: pt ambulates with antalgic gait, decreased stance time    Circumference mid patellar symmetrical    Effusion test: unable to assess, will assess NV.        Precautions: high irritation to medial knee      Manuals 3/27            Laser Medial knee/pes anserine            Knee PROM  Tolerable ROM                                      Neuro Re-Ed                                                                                                         Ther Ex             Heel slide HEP            Hip adduction sq HEP            Standing SLR HEP Flex, abd, ext            Isometric knee ext             Isometric knee flex             SAQ             HS stretch                          Ther Activity             Bike ROM as laura.             Heel raises             Gait Training                                       Modalities

## 2024-04-01 ENCOUNTER — OFFICE VISIT (OUTPATIENT)
Dept: PHYSICAL THERAPY | Facility: CLINIC | Age: 59
End: 2024-04-01
Payer: COMMERCIAL

## 2024-04-01 DIAGNOSIS — M23.91 INTERNAL DERANGEMENT OF RIGHT KNEE: Primary | ICD-10-CM

## 2024-04-01 PROCEDURE — 97140 MANUAL THERAPY 1/> REGIONS: CPT

## 2024-04-01 PROCEDURE — 97530 THERAPEUTIC ACTIVITIES: CPT

## 2024-04-01 PROCEDURE — 97110 THERAPEUTIC EXERCISES: CPT

## 2024-04-01 NOTE — PROGRESS NOTES
"Daily Note     Today's date: 2024  Patient name: Guerita Garcia  : 1965  MRN: [id number]  Referring provider: Candido Kramer MD  Dx:   Encounter Diagnosis     ICD-10-CM    1. Internal derangement of right knee  M23.91           Start Time:   Stop Time:   Total time in clinic (min): 45 minutes      Subjective: Patient states, \"It's actually doing a lot better than what it was.\"      Objective: See treatment diary below.      Assessment: TE program is tolerated well.  Right medial knee is tender during STM.      Plan: Continue treatment as per PT plan of care.       Precautions: high irritation to medial knee      Manuals 3/27 4/1           Laser Medial knee/pes anserine            STM medial knee/pes anserine  JLW           Knee PROM  Tolerable ROM JLW                                     Neuro Re-Ed                                                                                                        Ther Ex             Heel slide HEP            Hip adduction sq HEP with bridge  5\"x20           Standing SLR HEP Flex, abd, ext 2#  20 ea           Isometric knee ext             Isometric knee flex             SAQ  2#  5\"x20           HS stretch  30\"x3           cone toe taps on airex  20 ea                                     Ther Activity             Bike ROM as laura.  8'           Heel raises  20                                     Gait Training                                       Modalities                                            "

## 2024-04-03 ENCOUNTER — APPOINTMENT (OUTPATIENT)
Dept: PHYSICAL THERAPY | Facility: CLINIC | Age: 59
End: 2024-04-03
Payer: COMMERCIAL

## 2024-04-03 ENCOUNTER — OFFICE VISIT (OUTPATIENT)
Dept: FAMILY MEDICINE CLINIC | Facility: CLINIC | Age: 59
End: 2024-04-03
Payer: COMMERCIAL

## 2024-04-03 VITALS
BODY MASS INDEX: 28.58 KG/M2 | TEMPERATURE: 99.6 F | HEART RATE: 73 BPM | SYSTOLIC BLOOD PRESSURE: 122 MMHG | WEIGHT: 193 LBS | OXYGEN SATURATION: 95 % | RESPIRATION RATE: 18 BRPM | DIASTOLIC BLOOD PRESSURE: 74 MMHG | HEIGHT: 69 IN

## 2024-04-03 DIAGNOSIS — J20.9 ACUTE BRONCHITIS WITH BRONCHOSPASM: Primary | ICD-10-CM

## 2024-04-03 LAB
SARS-COV-2 AG UPPER RESP QL IA: NEGATIVE
SL AMB POCT RAPID FLU A: NEGATIVE
SL AMB POCT RAPID FLU B: NEGATIVE
VALID CONTROL: NORMAL

## 2024-04-03 PROCEDURE — 99213 OFFICE O/P EST LOW 20 MIN: CPT | Performed by: FAMILY MEDICINE

## 2024-04-03 PROCEDURE — 87804 INFLUENZA ASSAY W/OPTIC: CPT | Performed by: FAMILY MEDICINE

## 2024-04-03 PROCEDURE — 87811 SARS-COV-2 COVID19 W/OPTIC: CPT | Performed by: FAMILY MEDICINE

## 2024-04-03 RX ORDER — ALBUTEROL SULFATE 90 UG/1
2 AEROSOL, METERED RESPIRATORY (INHALATION) EVERY 4 HOURS PRN
Qty: 18 G | Refills: 1 | Status: SHIPPED | OUTPATIENT
Start: 2024-04-03

## 2024-04-03 RX ORDER — DOXYCYCLINE HYCLATE 100 MG/1
100 CAPSULE ORAL
Qty: 20 CAPSULE | Refills: 0 | Status: SHIPPED | OUTPATIENT
Start: 2024-04-03 | End: 2024-04-13

## 2024-04-03 RX ORDER — BENZONATATE 200 MG/1
200 CAPSULE ORAL 3 TIMES DAILY PRN
Qty: 30 CAPSULE | Refills: 0 | Status: SHIPPED | OUTPATIENT
Start: 2024-04-03

## 2024-04-03 NOTE — PROGRESS NOTES
Name: Guerita Garcia      : 1965      MRN: [id number]  Encounter Provider: Bozena Caballero MD  Encounter Date: 4/3/2024   Enc[facility]: [facility] [place]    Assessment & Plan     1. Acute bronchitis with bronchospasm  -     doxycycline hyclate (VIBRAMYCIN) 100 mg capsule; Take 1 capsule (100 mg total) by mouth 2 (two) times daily after meals for 10 days  -     albuterol (PROVENTIL HFA,VENTOLIN HFA) 90 mcg/act inhaler; Inhale 2 puffs every 4 (four) hours as needed for wheezing or shortness of breath (cough and wheeizng)  -     benzonatate (TESSALON) 200 MG capsule; Take 1 capsule (200 mg total) by mouth 3 (three) times a day as needed for cough  -     XR chest pa & lateral; Future; Expected date: 2024  -     POCT Rapid Covid Ag  -     POCT rapid flu A and B    The patient presents for evaluation of acute febrile upper respiratory infection.  She presents with symptoms of bronchitis with bronchospasm.  Testing for COVID and flu is negative.  Start doxycycline, albuterol inhaler and Tessalon Perles.  Continue Tylenol/ibuprofen for fever.  If no significant improvement 48 hours-patient will proceed with chest x-ray to rule out pneumonia.  She will contact me at any time if symptoms worsen or persist.       Subjective     Patient presents for evaluation of cough, generalized achiness and fever.  She denies symptoms of sore throat, admits to some sinus congestion, ongoing cough and wheezing.  Symptoms started on .  No recent travel.  No known sick contacts.      Cough  This is a new problem. The current episode started yesterday. The problem has been gradually worsening. The problem occurs every few minutes. The cough is Productive of sputum. Associated symptoms include chills, ear congestion, ear pain, a fever, headaches, myalgias, nasal congestion, rhinorrhea, sweats and wheezing. Pertinent negatives include no chest pain, heartburn, hemoptysis, postnasal drip,  rash, sore throat, shortness of breath or weight loss. Nothing aggravates the symptoms.     Review of Systems   Constitutional:  Positive for chills and fever. Negative for weight loss.   HENT:  Positive for ear pain and rhinorrhea. Negative for postnasal drip and sore throat.    Respiratory:  Positive for cough and wheezing. Negative for hemoptysis and shortness of breath.    Cardiovascular:  Negative for chest pain.   Gastrointestinal:  Negative for heartburn.   Musculoskeletal:  Positive for myalgias.   Skin:  Negative for rash.   Neurological:  Positive for headaches.       Past Medical History:   Diagnosis Date   • Bilateral fibrocystic breast changes    • Costochondritis    • Dense breasts    • Headache    • Hemorrhoids    • Loss of height    • Sinusitis    • Urinary frequency    • Wears glasses    • Wears glasses      Past Surgical History:   Procedure Laterality Date   • COLONOSCOPY     • INCONTINENCE SURGERY      age 39, Dr Nye   • CT COLONOSCOPY FLX DX W/COLLJ SPEC WHEN PFRMD N/A 10/04/2018    Procedure: COLONOSCOPY;  Surgeon: Flavio Rouse MD;  [facility];  Service: Gastroenterology   • CT OPEN TREATMENT RADIAL SHAFT FRACTURE Left 02/22/2023    Procedure: OPEN REDUCTION W/ INTERNAL FIXATION (ORIF) RADIUS;  Surgeon: Torsten Miller MD;  Location: [facility];  Service: Orthopedics   • TUBAL LIGATION     • UMBILICAL HERNIA REPAIR       Family History   Problem Relation Age of Onset   • Anemia Mother    • Breast cancer Mother         AGE DX UNK   • Cancer Mother    • Coronary artery disease Father    • Melanoma Father         malignant of skin AGE DX UNK   • Leukemia Father         AGE DX UNK   • Alcohol abuse Father    • Breast cancer Sister         AGE DX UNK   • No Known Problems Daughter    • No Known Problems Maternal Grandmother    • No Known Problems Maternal Grandfather    • No Known Problems Paternal Grandmother    • No Known Problems Paternal Grandfather    • No Known Problems  Alert/Cooperative/Awake Sister    • No Known Problems Brother    • No Known Problems Daughter    • Ovarian cancer Maternal Aunt 70   • Melanoma Paternal Uncle    • No Known Problems Paternal Aunt    • No Known Problems Paternal Aunt      Social History     Socioeconomic History   • Marital status: /Civil Union     Spouse name: None   • Number of children: 2   • Years of education: None   • Highest education level: None   Occupational History   • None   Tobacco Use   • Smoking status: Never   • Smokeless tobacco: Never   Vaping Use   • Vaping status: Never Used   Substance and Sexual Activity   • Alcohol use: Yes     Alcohol/week: 1.0 standard drink of alcohol     Types: 1 Glasses of wine per week     Comment: Socially Weekends   • Drug use: Never   • Sexual activity: Not Currently     Partners: Male     Birth control/protection: Post-menopausal, None   Other Topics Concern   • None   Social History Narrative    Caffeine use    Muslim    Uses seatbelts     Social Determinants of Health     Financial Resource Strain: Not on file   Food Insecurity: Not on file   Transportation Needs: Not on file   Physical Activity: Not on file   Stress: Not on file   Social Connections: Not on file   Intimate Partner Violence: Not on file   Housing Stability: Not on file     Current Outpatient Medications on File Prior to Visit   Medication Sig   • CALCIUM PO Take by mouth   • Cholecalciferol (VITAMIN D3) 1000 units CAPS Take 2,000 Units by mouth daily     • co-enzyme Q-10 30 MG capsule Take 30 mg by mouth 3 (three) times a day   • Garlic 1000 MG CAPS Take 1,000 mg by mouth daily   • multivitamin (THERAGRAN) TABS Take 1 tablet by mouth daily   • nabumetone (RELAFEN) 750 mg tablet Take 1 tablet (750 mg total) by mouth 2 (two) times a day   • Omega-3 Fatty Acids (fish oil) 1,000 mg Take 1,000 mg by mouth daily   • traZODone (DESYREL) 50 mg tablet TAKE 1 TO 2 TABLETS BY MOUTH AT BEDTIME AS NEEDED FOR INSOMNIA   • [DISCONTINUED]  "Phentermine-Topiramate (Qsymia) 3.75-23 MG CP24 Take 1 capsule by mouth in the morning     No Known Allergies  Immunization History   Administered Date(s) Administered   • COVID-19 PFIZER VACCINE 0.3 ML IM 04/18/2021, 05/09/2021, 02/05/2022       Objective     /74 (BP Location: Left arm, Patient Position: Sitting, Cuff Size: Standard)   Pulse 73   Temp 99.6 °F (37.6 °C) (Temporal)   Resp 18   Ht 5' 9\" (1.753 m)   Wt 87.5 kg (193 lb)   SpO2 95%   BMI 28.50 kg/m²     Physical Exam  Vitals and nursing note reviewed.   Constitutional:       General: She is not in acute distress.     Appearance: Normal appearance. She is not ill-appearing.   HENT:      Right Ear: Tympanic membrane normal.      Left Ear: Tympanic membrane normal.      Mouth/Throat:      Mouth: Mucous membranes are moist.      Pharynx: Posterior oropharyngeal erythema present.   Cardiovascular:      Rate and Rhythm: Normal rate and regular rhythm.      Heart sounds: Normal heart sounds. No murmur heard.  Pulmonary:      Effort: Prolonged expiration present.      Breath sounds: Normal air entry. Wheezing and rhonchi present. No rales.   Neurological:      General: No focal deficit present.      Mental Status: She is alert and oriented to person, place, and time.   Psychiatric:         Mood and Affect: Mood normal.         Behavior: Behavior normal.       Results for orders placed or performed in visit on 04/03/24   POCT Rapid Covid Ag   Result Value Ref Range    POCT SARS-CoV-2 Ag Negative Negative    VALID CONTROL Valid    POCT rapid flu A and B   Result Value Ref Range    RAPID FLU A negative     RAPID FLU B negative        Bozena Caballero MD    "

## 2024-04-09 ENCOUNTER — APPOINTMENT (OUTPATIENT)
Dept: PHYSICAL THERAPY | Facility: CLINIC | Age: 59
End: 2024-04-09
Payer: COMMERCIAL

## 2024-04-11 ENCOUNTER — APPOINTMENT (OUTPATIENT)
Dept: PHYSICAL THERAPY | Facility: CLINIC | Age: 59
End: 2024-04-11
Payer: COMMERCIAL

## 2024-04-16 ENCOUNTER — OFFICE VISIT (OUTPATIENT)
Dept: PHYSICAL THERAPY | Facility: CLINIC | Age: 59
End: 2024-04-16
Payer: COMMERCIAL

## 2024-04-16 DIAGNOSIS — M23.91 INTERNAL DERANGEMENT OF RIGHT KNEE: Primary | ICD-10-CM

## 2024-04-16 PROCEDURE — 97110 THERAPEUTIC EXERCISES: CPT | Performed by: PHYSICAL THERAPIST

## 2024-04-16 PROCEDURE — 97140 MANUAL THERAPY 1/> REGIONS: CPT | Performed by: PHYSICAL THERAPIST

## 2024-04-16 PROCEDURE — 97530 THERAPEUTIC ACTIVITIES: CPT | Performed by: PHYSICAL THERAPIST

## 2024-04-16 NOTE — PROGRESS NOTES
"Daily Note     Today's date: 2024  Patient name: Guerita Garcia  : 1965  MRN: [id number]  Referring provider: Candido Kramer MD  Dx:   Encounter Diagnosis     ICD-10-CM    1. Internal derangement of right knee  M23.91                      Subjective: Pt reports that her knee has been feeling very good. She feels like her knee is back to before injury.       Objective: See treatment diary below      Assessment: Pt tolerates treatment well today without significatn complaints. Progressions were made today with no complaints. Pt's strength is much improved and function subsequently improved. Pt to be placed on hold from PT for next 2 weeks and if there is no follow up, pt will be discharged from PT.       Plan: Hold from PT for 2 weeks, discharge if no follow up.      Precautions: high irritation to medial knee      Manuals 3/27 4/1 4/16          Laser Medial knee/pes anserine  DL 4' 10W          STM medial knee/pes anserine  JLW           Knee PROM  Tolerable ROM JLW Assessed (full ROM)                                    Neuro Re-Ed                                                                                                        Ther Ex             Heel slide HEP            Hip adduction sq HEP with bridge  5\"x20 W/ bridge 5\"x20          Standing SLR HEP Flex, abd, ext 2#  20 ea           Supine SLR   2x10          S/l hip abduction   2x10          Isometric knee ext             Isometric knee flex             SAQ  2#  5\"x20 LAQ 5# 20x5\"          HS stretch  30\"x3           cone toe taps on airex  20 ea np                                    Ther Activity             Bike ROM as laura.  8' 8'          Heel raises  20 30x                                    Gait Training                                       Modalities                                              "

## 2024-04-18 ENCOUNTER — APPOINTMENT (OUTPATIENT)
Dept: PHYSICAL THERAPY | Facility: CLINIC | Age: 59
End: 2024-04-18
Payer: COMMERCIAL

## 2024-04-23 ENCOUNTER — ANCILLARY ORDERS (OUTPATIENT)
Dept: OBGYN CLINIC | Facility: CLINIC | Age: 59
End: 2024-04-23

## 2024-04-23 ENCOUNTER — HOSPITAL ENCOUNTER (OUTPATIENT)
Dept: RADIOLOGY | Age: 59
Discharge: HOME | End: 2024-04-23
Payer: COMMERCIAL

## 2024-04-23 VITALS — BODY MASS INDEX: 28.58 KG/M2 | WEIGHT: 193 LBS | HEIGHT: 69 IN

## 2024-04-23 DIAGNOSIS — Z12.31 SCREENING MAMMOGRAM, ENCOUNTER FOR: Primary | ICD-10-CM

## 2024-04-23 DIAGNOSIS — Z12.31 SCREENING MAMMOGRAM, ENCOUNTER FOR: ICD-10-CM

## 2024-04-23 PROCEDURE — 77067 SCR MAMMO BI INCL CAD: CPT

## 2024-04-23 PROCEDURE — 77063 BREAST TOMOSYNTHESIS BI: CPT

## 2024-04-26 ENCOUNTER — OFFICE VISIT (OUTPATIENT)
Dept: SURGICAL ONCOLOGY | Facility: CLINIC | Age: 59
End: 2024-04-26
Payer: COMMERCIAL

## 2024-04-26 VITALS
RESPIRATION RATE: 18 BRPM | SYSTOLIC BLOOD PRESSURE: 118 MMHG | TEMPERATURE: 97.5 F | HEIGHT: 69 IN | BODY MASS INDEX: 30.07 KG/M2 | HEART RATE: 61 BPM | OXYGEN SATURATION: 97 % | DIASTOLIC BLOOD PRESSURE: 70 MMHG | WEIGHT: 203 LBS

## 2024-04-26 DIAGNOSIS — Z91.89 INCREASED RISK OF BREAST CANCER: ICD-10-CM

## 2024-04-26 DIAGNOSIS — N60.11 BILATERAL FIBROCYSTIC BREAST CHANGES: ICD-10-CM

## 2024-04-26 DIAGNOSIS — Z80.3 FAMILY HISTORY OF BREAST CANCER IN FEMALE: Primary | ICD-10-CM

## 2024-04-26 DIAGNOSIS — N60.12 BILATERAL FIBROCYSTIC BREAST CHANGES: ICD-10-CM

## 2024-04-26 PROCEDURE — 99213 OFFICE O/P EST LOW 20 MIN: CPT

## 2024-04-26 RX ORDER — CIPROFLOXACIN 250 MG/1
TABLET, FILM COATED ORAL
COMMUNITY
Start: 2024-04-11 | End: 2024-05-03

## 2024-04-26 NOTE — PROGRESS NOTES
Surgical Oncology Follow Up       [facility] [place]  [address] [facility]  [place] [address]    Guerita Garcia  1965  [id number]  [facility] [place]  [address]  [facility] [place] [address]    Diagnoses and all orders for this visit:    Family history of breast cancer in female    Bilateral fibrocystic breast changes    Increased risk of breast cancer    Other orders  -     ciprofloxacin (CIPRO) 250 mg tablet; 1 tablet Orally twice a day starting on 4/30 (Tuesday) Take first dose in the morning that day for 5 days        Chief Complaint   Patient presents with    Follow-up       Return in about 1 year (around 4/26/2025) for Office Visit.      History of Present Illness: The patient returns to the office today for high risk breast follow-up.  She is doing well, and denies any changes since her last visit.  She denies any new cancer diagnoses within her family.  She has not noticed any new lumps, nodules, skin changes or tenderness.  She is continuing to work to lose weight, and feels well overall.  Mammogram was performed on April 23, BIRADS-1, density category 2.  I have reviewed these results myself and discussed them with the patient today.      Review of Systems  Complete ROS Surg Onc:   Complete ROS Surg Onc:   Constitutional: The patient denies new or recent history of general fatigue, no recent weight loss, no change in appetite.   Eyes: No complaints of visual problems, no scleral icterus.   ENT: no complaints of ear pain, no hoarseness, no difficulty swallowing,  no tinnitus and no new masses in head, oral cavity, or neck.   Cardiovascular: No complaints of chest pain, no palpitations, no ankle edema.   Respiratory: No complaints of shortness of breath, no cough.   Gastrointestinal: No complaints of jaundice, no bloody stools, no pale stools.   Genitourinary: No complaints  of dysuria, no hematuria, no nocturia, no frequent urination, no urethral discharge.   Musculoskeletal: No complaints of weakness, paralysis, joint stiffness or arthralgias.  Integumentary: No complaints of rash, no new lesions.   Neurological: No complaints of convulsions, no seizures, no dizziness.   Hematologic/Lymphatic: No complaints of easy bruising.   Endocrine:  No hot or cold intolerance.  No polydipsia, polyphagia, or polyuria.  Allergy/immunology:  No environmental allergies.  No food allergies.  Not immunocompromised.  Skin:  No pallor or rash.  No wound.        Patient Active Problem List   Diagnosis    Other hyperlipidemia    Vitamin D deficiency    Bilateral fibrocystic breast changes    Dense breasts    Family history of breast cancer in female    Screening mammogram, encounter for    Loss of height    Family history of malignant melanoma    Skin pruritus    Family history of ovarian carcinoma    Closed fracture distal radius and ulna, left, initial encounter    S/P ORIF (open reduction internal fixation) fracture    Class 1 obesity due to excess calories without serious comorbidity with body mass index (BMI) of 33.0 to 33.9 in adult    Insomnia    Sleep disorder breathing    Elevated bilirubin    Obstructive sleep apnea syndrome    Internal derangement of right knee    Increased risk of breast cancer     Past Medical History:   Diagnosis Date    Bilateral fibrocystic breast changes     Costochondritis     Dense breasts     Headache     Hemorrhoids     Loss of height     Sinusitis     Urinary frequency     Wears glasses     Wears glasses      Past Surgical History:   Procedure Laterality Date    COLONOSCOPY      INCONTINENCE SURGERY      age 39, Dr Nye    CT COLONOSCOPY FLX DX W/COLLJ SPEC WHEN PFRMD N/A 10/04/2018    Procedure: COLONOSCOPY;  Surgeon: Flavio Rouse MD;  [facility];  Service: Gastroenterology    CT OPEN TREATMENT RADIAL SHAFT FRACTURE Left 02/22/2023    Procedure: OPEN  REDUCTION W/ INTERNAL FIXATION (ORIF) RADIUS;  Surgeon: Torsten Miller MD;  Location: [facility] MAIN OR;  Service: Orthopedics    TUBAL LIGATION      UMBILICAL HERNIA REPAIR       Family History   Problem Relation Age of Onset    Anemia Mother     Breast cancer Mother         AGE DX UNK    Cancer Mother     Coronary artery disease Father     Melanoma Father         malignant of skin AGE DX UNK    Leukemia Father         AGE DX UNK    Alcohol abuse Father     Breast cancer Sister         AGE DX UNK    No Known Problems Daughter     No Known Problems Maternal Grandmother     No Known Problems Maternal Grandfather     No Known Problems Paternal Grandmother     No Known Problems Paternal Grandfather     No Known Problems Sister     No Known Problems Brother     No Known Problems Daughter     Ovarian cancer Maternal Aunt 70    Melanoma Paternal Uncle     No Known Problems Paternal Aunt     No Known Problems Paternal Aunt      Social History     Socioeconomic History    Marital status: /Civil Union     Spouse name: Not on file    Number of children: 2    Years of education: Not on file    Highest education level: Not on file   Occupational History    Not on file   Tobacco Use    Smoking status: Never    Smokeless tobacco: Never   Vaping Use    Vaping status: Never Used   Substance and Sexual Activity    Alcohol use: Yes     Alcohol/week: 1.0 standard drink of alcohol     Types: 1 Glasses of wine per week     Comment: Socially Weekends    Drug use: Never    Sexual activity: Not Currently     Partners: Male     Birth control/protection: Post-menopausal, None   Other Topics Concern    Not on file   Social History Narrative    Caffeine use    Rastafari    Uses seatbelts     Social Determinants of Health     Financial Resource Strain: Not on file   Food Insecurity: Not on file   Transportation Needs: Not on file   Physical Activity: Not on file   Stress: Not on file   Social Connections: Not on file   Intimate  Partner Violence: Not on file   Housing Stability: Not on file       Current Outpatient Medications:     CALCIUM PO, Take by mouth, Disp: , Rfl:     Cholecalciferol (VITAMIN D3) 1000 units CAPS, Take 2,000 Units by mouth daily  , Disp: , Rfl:     ciprofloxacin (CIPRO) 250 mg tablet, 1 tablet Orally twice a day starting on 4/30 (Tuesday) Take first dose in the morning that day for 5 days, Disp: , Rfl:     co-enzyme Q-10 30 MG capsule, Take 30 mg by mouth 3 (three) times a day, Disp: , Rfl:     Garlic 1000 MG CAPS, Take 1,000 mg by mouth daily, Disp: , Rfl:     multivitamin (THERAGRAN) TABS, Take 1 tablet by mouth daily, Disp: , Rfl:     Omega-3 Fatty Acids (fish oil) 1,000 mg, Take 1,000 mg by mouth daily, Disp: , Rfl:     traZODone (DESYREL) 50 mg tablet, TAKE 1 TO 2 TABLETS BY MOUTH AT BEDTIME AS NEEDED FOR INSOMNIA, Disp: 60 tablet, Rfl: 3    albuterol (PROVENTIL HFA,VENTOLIN HFA) 90 mcg/act inhaler, Inhale 2 puffs every 4 (four) hours as needed for wheezing or shortness of breath (cough and wheeizng) (Patient not taking: Reported on 4/26/2024), Disp: 18 g, Rfl: 1    benzonatate (TESSALON) 200 MG capsule, Take 1 capsule (200 mg total) by mouth 3 (three) times a day as needed for cough (Patient not taking: Reported on 4/26/2024), Disp: 30 capsule, Rfl: 0    nabumetone (RELAFEN) 750 mg tablet, Take 1 tablet (750 mg total) by mouth 2 (two) times a day (Patient not taking: Reported on 4/26/2024), Disp: 60 tablet, Rfl: 0    Current Facility-Administered Medications:     lidocaine (XYLOCAINE) 0.5 % injection 0.5 mL, 0.5 mL, Infiltration, , Jorge Bronfenbrenner, DPM, 0.5 mL at 09/20/23 0800    triamcinolone acetonide (KENALOG-40) 40 mg/mL injection 40 mg, 40 mg, Infiltration, , Jorge Bronfenbrenner, DPM, 40 mg at 09/20/23 0800  No Known Allergies  Vitals:    04/26/24 0805   BP: 118/70   Pulse: 61   Resp: 18   Temp: 97.5 °F (36.4 °C)   SpO2: 97%       Physical Exam  Vitals reviewed.   Constitutional:       General: She is  not in acute distress.     Appearance: Normal appearance. She is not ill-appearing or toxic-appearing.   HENT:      Head: Normocephalic and atraumatic.      Nose: Nose normal.      Mouth/Throat:      Mouth: Mucous membranes are moist.   Eyes:      General: No scleral icterus.     Conjunctiva/sclera: Conjunctivae normal.   Cardiovascular:      Rate and Rhythm: Normal rate.   Pulmonary:      Effort: Pulmonary effort is normal.   Chest:   Breasts:     Right: Normal.      Left: Normal.      Comments: Breasts are smooth and symmetric bilaterally with occasional islands of dense tissue.  There are no dominant masses, nodules, skin changes or tenderness on exam. I do not appreciate any adenopathy.    Musculoskeletal:         General: No swelling or tenderness. Normal range of motion.      Cervical back: Normal range of motion and neck supple.   Lymphadenopathy:      Cervical: No cervical adenopathy.      Upper Body:      Right upper body: No supraclavicular, axillary or pectoral adenopathy.      Left upper body: No supraclavicular, axillary or pectoral adenopathy.   Skin:     General: Skin is warm and dry.      Findings: No erythema.   Neurological:      General: No focal deficit present.      Mental Status: She is alert and oriented to person, place, and time.   Psychiatric:         Mood and Affect: Mood normal.         Behavior: Behavior normal.         Thought Content: Thought content normal.         Judgment: Judgment normal.             Imaging  Mammo screening bilateral w 3d & cad    Result Date: 4/24/2024  Narrative: DIAGNOSIS: Screening mammogram, encounter for TECHNIQUE: Digital screening mammography was performed. Computer Aided Detection (CAD) analyzed all applicable images. COMPARISONS: Prior breast imaging dated: 09/05/2023, 04/19/2023, 08/25/2022, 02/16/2022, 08/25/2021, 02/11/2021, 08/24/2020, 02/03/2020, 08/23/2019, 08/22/2018, 02/23/2018, 08/16/2017, 02/17/2017, 03/04/2016, 02/12/2016, 02/06/2015, and  12/18/2013 RELEVANT HISTORY: Family Breast Cancer History: History of breast cancer in Mother, Sister. Family Medical History: Family medical history includes breast cancer in 2 relatives (mother, sister) and ovarian cancer in maternal aunt. Personal History: Hormone history includes birth control. No known relevant surgical history. Medical history includes fibrocystic breast. The patient is scheduled in a reminder system for screening mammography. 8-10% of cancers will be missed on mammography. Management of a palpable abnormality must be based on clinical grounds.  Patients will be notified of their results via letter from our facility. Accredited by [employer] College [employer] and FDA. RISK ASSESSMENT: 5 Year Tyrer-Cuzick: 3.92% 10 Year Tyrer-Cuzick: 8.28% Lifetime Tyrer-Cuzick: 20.52% TISSUE DENSITY: There are scattered areas of fibroglandular density. INDICATION: Guerita Garcia is a 58 y.o. female presenting for screening mammography. FINDINGS: There are no suspicious masses, grouped microcalcifications or areas of architectural distortion. The skin and nipple areolar complex are unremarkable.     Impression: No mammographic evidence of malignancy. No significant change This patient has been identified as being at elevated risk for development of breast cancer based on the Tyrer-Cuzick model. She may benefit from supplemental screening. ASSESSMENT/BI-RADS CATEGORY: Left: 1 - Negative Right: 1 - Negative Overall: 1 - Negative RECOMMENDATION:      - Routine screening mammogram in 1 year for both breasts. Workstation ID: [id number]     I personally reviewed and interpreted the above imaging data.    Discussion/Summary: This is a 59 y/o female who presents today for high risk breast surveillance.  She is doing well and there is no concerning findings on imaging or physical exam. I will see her again in 1 year for another clinical exam, or sooner should the need arise.  She is agreeable to the plan, all  questions were answered today.

## 2024-05-03 PROBLEM — R06.83 SNORING: Status: ACTIVE | Noted: 2024-05-03

## 2024-06-19 DIAGNOSIS — Z78.0 MENOPAUSE: Primary | ICD-10-CM

## 2024-07-03 DIAGNOSIS — G47.00 INSOMNIA, UNSPECIFIED TYPE: ICD-10-CM

## 2024-07-03 RX ORDER — TRAZODONE HYDROCHLORIDE 50 MG/1
TABLET ORAL
Qty: 60 TABLET | Refills: 5 | Status: SHIPPED | OUTPATIENT
Start: 2024-07-03

## 2024-07-10 ENCOUNTER — HOSPITAL ENCOUNTER (OUTPATIENT)
Dept: RADIOLOGY | Age: 59
Discharge: HOME | End: 2024-07-10
Payer: COMMERCIAL

## 2024-07-10 PROCEDURE — 77080 DXA BONE DENSITY AXIAL: CPT

## 2024-07-16 DIAGNOSIS — M85.859 OSTEOPENIA OF HIP, UNSPECIFIED LATERALITY: ICD-10-CM

## 2024-07-16 DIAGNOSIS — E55.9 VITAMIN D DEFICIENCY: Primary | Chronic | ICD-10-CM

## 2024-07-16 DIAGNOSIS — E78.49 OTHER HYPERLIPIDEMIA: Chronic | ICD-10-CM

## 2024-07-16 DIAGNOSIS — R73.02 IGT (IMPAIRED GLUCOSE TOLERANCE): ICD-10-CM

## 2024-07-16 PROBLEM — S52.502A CLOSED FRACTURE DISTAL RADIUS AND ULNA, LEFT, INITIAL ENCOUNTER: Status: RESOLVED | Noted: 2023-03-06 | Resolved: 2024-07-16

## 2024-07-16 PROBLEM — S52.602A CLOSED FRACTURE DISTAL RADIUS AND ULNA, LEFT, INITIAL ENCOUNTER: Status: RESOLVED | Noted: 2023-03-06 | Resolved: 2024-07-16

## 2024-08-01 ENCOUNTER — APPOINTMENT (OUTPATIENT)
Dept: LAB | Facility: CLINIC | Age: 59
End: 2024-08-01
Payer: COMMERCIAL

## 2024-08-01 DIAGNOSIS — E55.9 VITAMIN D DEFICIENCY: Chronic | ICD-10-CM

## 2024-08-01 DIAGNOSIS — E78.49 OTHER HYPERLIPIDEMIA: Chronic | ICD-10-CM

## 2024-08-01 DIAGNOSIS — R73.02 IGT (IMPAIRED GLUCOSE TOLERANCE): ICD-10-CM

## 2024-08-01 LAB
25(OH)D3 SERPL-MCNC: 71 NG/ML (ref 30–100)
ALBUMIN SERPL BCG-MCNC: 4 G/DL (ref 3.5–5)
ALP SERPL-CCNC: 71 U/L (ref 34–104)
ALT SERPL W P-5'-P-CCNC: 12 U/L (ref 7–52)
ANION GAP SERPL CALCULATED.3IONS-SCNC: 9 MMOL/L (ref 4–13)
AST SERPL W P-5'-P-CCNC: 11 U/L (ref 13–39)
BILIRUB SERPL-MCNC: 0.99 MG/DL (ref 0.2–1)
BUN SERPL-MCNC: 10 MG/DL (ref 5–25)
CALCIUM SERPL-MCNC: 9.2 MG/DL (ref 8.4–10.2)
CHLORIDE SERPL-SCNC: 104 MMOL/L (ref 96–108)
CHOLEST SERPL-MCNC: 292 MG/DL
CO2 SERPL-SCNC: 27 MMOL/L (ref 21–32)
CREAT SERPL-MCNC: 0.68 MG/DL (ref 0.6–1.3)
ERYTHROCYTE [DISTWIDTH] IN BLOOD BY AUTOMATED COUNT: 13.6 % (ref 11.6–15.1)
EST. AVERAGE GLUCOSE BLD GHB EST-MCNC: 120 MG/DL
GFR SERPL CREATININE-BSD FRML MDRD: 95 ML/MIN/1.73SQ M
GLUCOSE P FAST SERPL-MCNC: 89 MG/DL (ref 65–99)
HBA1C MFR BLD: 5.8 %
HCT VFR BLD AUTO: 40.5 % (ref 34.8–46.1)
HDLC SERPL-MCNC: 71 MG/DL
HGB BLD-MCNC: 12.7 G/DL (ref 11.5–15.4)
LDLC SERPL CALC-MCNC: 199 MG/DL (ref 0–100)
MCH RBC QN AUTO: 30.1 PG (ref 26.8–34.3)
MCHC RBC AUTO-ENTMCNC: 31.4 G/DL (ref 31.4–37.4)
MCV RBC AUTO: 96 FL (ref 82–98)
PLATELET # BLD AUTO: 336 THOUSANDS/UL (ref 149–390)
PMV BLD AUTO: 8.6 FL (ref 8.9–12.7)
POTASSIUM SERPL-SCNC: 4.2 MMOL/L (ref 3.5–5.3)
PROT SERPL-MCNC: 6.8 G/DL (ref 6.4–8.4)
RBC # BLD AUTO: 4.22 MILLION/UL (ref 3.81–5.12)
SODIUM SERPL-SCNC: 140 MMOL/L (ref 135–147)
TRIGL SERPL-MCNC: 109 MG/DL
TSH SERPL DL<=0.05 MIU/L-ACNC: 1.28 UIU/ML (ref 0.45–4.5)
WBC # BLD AUTO: 6.3 THOUSAND/UL (ref 4.31–10.16)

## 2024-08-01 PROCEDURE — 82306 VITAMIN D 25 HYDROXY: CPT

## 2024-08-01 PROCEDURE — 84443 ASSAY THYROID STIM HORMONE: CPT

## 2024-08-01 PROCEDURE — 85027 COMPLETE CBC AUTOMATED: CPT

## 2024-08-01 PROCEDURE — 80061 LIPID PANEL: CPT

## 2024-08-01 PROCEDURE — 36415 COLL VENOUS BLD VENIPUNCTURE: CPT

## 2024-08-01 PROCEDURE — 80053 COMPREHEN METABOLIC PANEL: CPT

## 2024-08-01 PROCEDURE — 83036 HEMOGLOBIN GLYCOSYLATED A1C: CPT

## 2024-08-20 ENCOUNTER — OFFICE VISIT (OUTPATIENT)
Dept: ENDOCRINOLOGY | Facility: CLINIC | Age: 59
End: 2024-08-20
Payer: COMMERCIAL

## 2024-08-20 VITALS
SYSTOLIC BLOOD PRESSURE: 120 MMHG | OXYGEN SATURATION: 99 % | BODY MASS INDEX: 28.98 KG/M2 | DIASTOLIC BLOOD PRESSURE: 82 MMHG | HEART RATE: 56 BPM | WEIGHT: 191.2 LBS | HEIGHT: 68 IN

## 2024-08-20 DIAGNOSIS — M85.859 OSTEOPENIA OF HIP, UNSPECIFIED LATERALITY: ICD-10-CM

## 2024-08-20 DIAGNOSIS — M81.0 AGE-RELATED OSTEOPOROSIS WITHOUT CURRENT PATHOLOGICAL FRACTURE: Primary | ICD-10-CM

## 2024-08-20 PROCEDURE — 99244 OFF/OP CNSLTJ NEW/EST MOD 40: CPT | Performed by: INTERNAL MEDICINE

## 2024-08-20 RX ORDER — TERIPARATIDE 250 UG/ML
20 INJECTION, SOLUTION SUBCUTANEOUS DAILY
Qty: 7.2 ML | Refills: 3 | Status: SHIPPED | OUTPATIENT
Start: 2024-08-20

## 2024-08-20 NOTE — PATIENT INSTRUCTIONS
-Please take 1200 mg of calcium daily and cut your dose of vitamin D in half  -We have sent for Fort[employer] to see if this is covered by your insurance; we will likely need a prior auth  -Please consider adding additonal weight bearing exercises  -Follow-up in 6 months

## 2024-08-20 NOTE — PROGRESS NOTES
No chief complaint on file.     Referring Provider  Bozena Caballero Md  [address] [place] [id number]     History of Present Illness:   Guerita Garcia is a 59 y.o. female with osteopenia/porosis seen in consultation at the request of her PCP for evaluation of osteopenia of the femoral neck as seen on recent DEXA scan.       HPI:   Onset (age at diagnosis)   History of fragility fractures (low impact/vertebral/hip, etc vs trauma related)  date -- site  Hardware in hip or spine/spinal surgery (kyphoplasty/vertebroplasty)   Severe DJD   History of HRT use   Early menopause (either premature, or early TAHBSO without HRT)   Family history of osteoporosis   Rheumatoid arthritis   Glucocorticoid use   Smoking history  Alcohol use  PPI use  Antiseizure medication  Eating disorder  Dietary Calcium intake (Quantify milk, cheese, yogurt, etc)   Calcium/Vitamin D supplementation (including MVI)   Weight bearing exercises     Last DXA Scan:-     Medications used in past:   Bisphosphonates   Bethanie/abaloparitide   Denosumab   Estrogens  Sclerostin inhibitors  SERMS    Patient Active Problem List   Diagnosis    Other hyperlipidemia    Vitamin D deficiency    Bilateral fibrocystic breast changes    Dense breasts    Family history of breast cancer in female    Screening mammogram, encounter for    Loss of height    Family history of malignant melanoma    Skin pruritus    Family history of ovarian carcinoma    S/P ORIF (open reduction internal fixation) fracture    Class 1 obesity due to excess calories without serious comorbidity with body mass index (BMI) of 33.0 to 33.9 in adult    Insomnia    Sleep disorder breathing    Elevated bilirubin    LETHA (obstructive sleep apnea)    Internal derangement of right knee    Increased risk of breast cancer    Snoring    Osteopenia of hip      Past Medical History:   Diagnosis Date    Bilateral fibrocystic breast changes     Closed fracture distal radius and ulna, left, initial  encounter 03/06/2023    Costochondritis     Dense breasts     Headache     Hemorrhoids     Loss of height     Sinusitis     Urinary frequency     Wears glasses     Wears glasses       Past Surgical History:   Procedure Laterality Date    COLONOSCOPY      INCONTINENCE SURGERY      age 39, Dr Nye    [place] COLONOSCOPY FLX DX W/COLLJ SPEC WHEN PFRMD N/A 10/04/2018    Procedure: COLONOSCOPY;  Surgeon: Flavio Rouse MD;  [facility];  Service: Gastroenterology    [place] OPEN TREATMENT RADIAL SHAFT FRACTURE Left 02/22/2023    Procedure: OPEN REDUCTION W/ INTERNAL FIXATION (ORIF) RADIUS;  Surgeon: Torsten Miller MD;  [facility];  Service: Orthopedics    TUBAL LIGATION      UMBILICAL HERNIA REPAIR        Family History   Problem Relation Age of Onset    Anemia Mother     Breast cancer Mother         AGE DX UNK    Cancer Mother     Coronary artery disease Father     Melanoma Father         malignant of skin AGE DX UNK    Leukemia Father         AGE DX UNK    Alcohol abuse Father     Breast cancer Sister         AGE DX UNK    No Known Problems Daughter     No Known Problems Maternal Grandmother     No Known Problems Maternal Grandfather     No Known Problems Paternal Grandmother     No Known Problems Paternal Grandfather     No Known Problems Sister     No Known Problems Brother     No Known Problems Daughter     Ovarian cancer Maternal Aunt 70    Melanoma Paternal Uncle     No Known Problems Paternal Aunt     No Known Problems Paternal Aunt      Social History     Tobacco Use    Smoking status: Never    Smokeless tobacco: Never   Substance Use Topics    Alcohol use: Yes     Alcohol/week: 1.0 standard drink of alcohol     Types: 1 Glasses of wine per week     Comment: Socially Weekends     No Known Allergies      Current Outpatient Medications:     CALCIUM PO, Take by mouth, Disp: , Rfl:     Cholecalciferol (VITAMIN D3) 1000 units CAPS, Take 2,000 Units by mouth daily  , Disp: , Rfl:     co-enzyme Q-10  "30 MG capsule, Take 30 mg by mouth 3 (three) times a day, Disp: , Rfl:     Garlic 1000 MG CAPS, Take 1,000 mg by mouth daily, Disp: , Rfl:     multivitamin (THERAGRAN) TABS, Take 1 tablet by mouth daily, Disp: , Rfl:     Omega-3 Fatty Acids (fish oil) 1,000 mg, Take 1,000 mg by mouth daily, Disp: , Rfl:     traZODone (DESYREL) 50 mg tablet, TAKE 1 TO 2 TABLETS BY MOUTH AT BEDTIME AS NEEDED FOR INSOMNIA, Disp: 60 tablet, Rfl: 5    albuterol (PROVENTIL HFA,VENTOLIN HFA) 90 mcg/act inhaler, Inhale 2 puffs every 4 (four) hours as needed for wheezing or shortness of breath (cough and wheeizng) (Patient not taking: Reported on 4/26/2024), Disp: 18 g, Rfl: 1    benzonatate (TESSALON) 200 MG capsule, Take 1 capsule (200 mg total) by mouth 3 (three) times a day as needed for cough (Patient not taking: Reported on 4/26/2024), Disp: 30 capsule, Rfl: 0    Current Facility-Administered Medications:     lidocaine (XYLOCAINE) 0.5 % injection 0.5 mL, 0.5 mL, Infiltration, , Jorge Bronfenbrenner, DPM, 0.5 mL at 09/20/23 0800    triamcinolone acetonide (KENALOG-40) 40 mg/mL injection 40 mg, 40 mg, Infiltration, , Jorge Bronfenbrenner, DPM, 40 mg at 09/20/23 0800  Review of Systems    Physical Exam:  Body mass index is 29.07 kg/m².  /82   Pulse 56   Ht 5' 8\" (1.727 m)   Wt 86.7 kg (191 lb 3.2 oz)   SpO2 99%   BMI 29.07 kg/m²    Wt Readings from Last 3 Encounters:   08/20/24 86.7 kg (191 lb 3.2 oz)   05/03/24 92.2 kg (203 lb 3.2 oz)   04/26/24 92.1 kg (203 lb)       GEN: NAD  E/n/m nl facies, hearing intact bilat, tongue midline, lips nl  Eyes: no stare or proptosis, nl lids and conjunctiva, EOMI  Neck: trachea midline, thyroid NT to palpation, nl in size, no nodules or neck masses noted, no cervical LAD  CV; heart reg rate s1s2 nl, no m/r/g appreciated, no MIKAL  Resp: CTAB, good effort  Ab+BS  Neuro: no tremor, 2+ DTRs in BUE  MS: no c/c in digits, moves all 4 ext, nl muscle bulk, gait nl  Skin: warm and dry, no palmar " "erythema  Ext: no c/c in digits, no edema bilaterally, 2+ DP and PT pulses bilat, no breaks in skin/ulcers on feet, sensation intact to monofilament testing on plantar surfaces bilat  Psych: nl mood and affect, no gross lapses in memory    DATA:  Labs:       No results found for: \"TSH\", \"FREET4\", \"TSI\"  Lab Results   Component Value Date    SODIUM 140 08/01/2024    K 4.2 08/01/2024     08/01/2024    CO2 27 08/01/2024    BUN 10 08/01/2024    CREATININE 0.68 08/01/2024    CALCIUM 9.2 08/01/2024      Lab Results   Component Value Date    CALCIUM 9.2 08/01/2024      No results found for: \"LABPROT\"     Radiology    07/10/2024       Impression:  1. Osteopenia of hip, unspecified laterality         Plan:    Diagnoses and all orders for this visit:    Osteopenia of hip, unspecified laterality  -     Ambulatory referral to Endocrinology      1. Osteopenia of hip, unspecified laterality  ***  - Ambulatory referral to Endocrinology    PLAN   Osteoporosis:   Review DXA--look and document for least significant change (not just T scores). Review Medications, side effects, benefits, duration.  on calcium/vit D supplementation.  on avoiding risk (low lying rugs, handrails, bathtubs, high beds, etc) . Exercise 30-60mins of weight bearing exercise at least x per week. Diet  calcium    Discussed with the patient and all questioned fully answered. She will call me if any problems arise.        Micah Reyes MD                   "

## 2024-08-20 NOTE — PROGRESS NOTES
No chief complaint on file.     Referring Provider  Bozena aCballero Md  [address] [place] [id number]     History of Present Illness:   Guerita Garcia is a 59 y.o. female with osteopenia/porosis seen in consultation at the request of her PCP for evaluation of a recent diagnosis of osteopenia as seen on recent DEXA scan. This was obtained at age 58 after she reported loss of several inches of height over the preceding years.  She is now 5 foot 8 with a peak height of 5 foot 11.  She does have a family history of osteoporosis in her mother though she is uncertain of what age that this was diagnosed.  She does note a history of wrist fracture following a fall from standing height approximately 2 years ago which required surgery but denies any other fragility fractures in the past.  She has never had spinal surgery or placement of hardware in her hip or spinal column.  She denies any history of DJD, rheumatoid arthritis, or early menopause (menopause occurred at age 49).  She has never used hormone replacement therapy or glucocorticoids and only sporadically uses PPIs.  She has never used tobacco products and only occasionally drinks alcohol.  She denies any history of eating disorders or seizure disorder.  She does not routinely eat dairy products but does supplement her calcium intake with at least 1200 mg of elemental calcium.  She does take a vitamin D supplement as well though is uncertain of the exact dosage of this.  She does routinely perform weightbearing exercise in the form of daily walks but does not do much in the way of resistance training.  As this is a new diagnosis, she has never been on any dedicated medications for her bone health.    Of note, patient's last DEXA scan from July 2024 showed T-score -0.9 at the lumbar spine, -0.8 at the left hip, and -2 at the left femoral neck with a calculated FRAX score of 1.1% for hip fracture and 9% for major osteoporotic fracture.      Patient  Active Problem List   Diagnosis    Other hyperlipidemia    Vitamin D deficiency    Bilateral fibrocystic breast changes    Dense breasts    Family history of breast cancer in female    Screening mammogram, encounter for    Loss of height    Family history of malignant melanoma    Skin pruritus    Family history of ovarian carcinoma    S/P ORIF (open reduction internal fixation) fracture    Class 1 obesity due to excess calories without serious comorbidity with body mass index (BMI) of 33.0 to 33.9 in adult    Insomnia    Sleep disorder breathing    Elevated bilirubin    LETHA (obstructive sleep apnea)    Internal derangement of right knee    Increased risk of breast cancer    Snoring    Osteopenia of hip      Past Medical History:   Diagnosis Date    Bilateral fibrocystic breast changes     Closed fracture distal radius and ulna, left, initial encounter 03/06/2023    Costochondritis     Dense breasts     Headache     Hemorrhoids     Loss of height     Sinusitis     Urinary frequency     Wears glasses     Wears glasses       Past Surgical History:   Procedure Laterality Date    COLONOSCOPY      INCONTINENCE SURGERY      age 39, Dr Nye    [place] COLONOSCOPY FLX DX W/COLLJ SPEC WHEN PFRMD N/A 10/04/2018    Procedure: COLONOSCOPY;  Surgeon: Flavio Rouse MD;  [facility];  Service: Gastroenterology    [place] OPEN TREATMENT RADIAL SHAFT FRACTURE Left 02/22/2023    Procedure: OPEN REDUCTION W/ INTERNAL FIXATION (ORIF) RADIUS;  Surgeon: Torsten Miller MD;  [facility];  Service: Orthopedics    TUBAL LIGATION      UMBILICAL HERNIA REPAIR        Family History   Problem Relation Age of Onset    Anemia Mother     Breast cancer Mother         AGE DX UNK    Cancer Mother     Coronary artery disease Father     Melanoma Father         malignant of skin AGE DX UNK    Leukemia Father         AGE DX UNK    Alcohol abuse Father     Breast cancer Sister         AGE DX UNK    No Known Problems Daughter     No Known  Problems Maternal Grandmother     No Known Problems Maternal Grandfather     No Known Problems Paternal Grandmother     No Known Problems Paternal Grandfather     No Known Problems Sister     No Known Problems Brother     No Known Problems Daughter     Ovarian cancer Maternal Aunt 70    Melanoma Paternal Uncle     No Known Problems Paternal Aunt     No Known Problems Paternal Aunt      Social History     Tobacco Use    Smoking status: Never    Smokeless tobacco: Never   Substance Use Topics    Alcohol use: Yes     Alcohol/week: 1.0 standard drink of alcohol     Types: 1 Glasses of wine per week     Comment: Socially Weekends     No Known Allergies      Current Outpatient Medications:     CALCIUM PO, Take by mouth, Disp: , Rfl:     Cholecalciferol (VITAMIN D3) 1000 units CAPS, Take 2,000 Units by mouth daily  , Disp: , Rfl:     co-enzyme Q-10 30 MG capsule, Take 30 mg by mouth 3 (three) times a day, Disp: , Rfl:     Garlic 1000 MG CAPS, Take 1,000 mg by mouth daily, Disp: , Rfl:     multivitamin (THERAGRAN) TABS, Take 1 tablet by mouth daily, Disp: , Rfl:     Omega-3 Fatty Acids (fish oil) 1,000 mg, Take 1,000 mg by mouth daily, Disp: , Rfl:     Teriparatide, Recombinant, 600 MCG/2.4ML SOPN, Inject 20 mcg under the skin in the morning, Disp: 7.2 mL, Rfl: 3    traZODone (DESYREL) 50 mg tablet, TAKE 1 TO 2 TABLETS BY MOUTH AT BEDTIME AS NEEDED FOR INSOMNIA, Disp: 60 tablet, Rfl: 5    albuterol (PROVENTIL HFA,VENTOLIN HFA) 90 mcg/act inhaler, Inhale 2 puffs every 4 (four) hours as needed for wheezing or shortness of breath (cough and wheeizng) (Patient not taking: Reported on 4/26/2024), Disp: 18 g, Rfl: 1    benzonatate (TESSALON) 200 MG capsule, Take 1 capsule (200 mg total) by mouth 3 (three) times a day as needed for cough (Patient not taking: Reported on 4/26/2024), Disp: 30 capsule, Rfl: 0    Current Facility-Administered Medications:     lidocaine (XYLOCAINE) 0.5 % injection 0.5 mL, 0.5 mL, Infiltration, , Jorge  "Maggie, DPM, 0.5 mL at 09/20/23 0800    triamcinolone acetonide (KENALOG-40) 40 mg/mL injection 40 mg, 40 mg, Infiltration, , Jorge Bronfenbrennkemar, DPM, 40 mg at 09/20/23 0800    Review of Systems   Constitutional:  Negative for chills, fatigue and fever.   HENT:  Negative for trouble swallowing.    Eyes:  Negative for visual disturbance.   Respiratory:  Negative for shortness of breath.    Cardiovascular:  Negative for chest pain.   Gastrointestinal:  Negative for abdominal pain, constipation and diarrhea.   Endocrine: Negative for polydipsia, polyphagia and polyuria.   Genitourinary:  Negative for dysuria.   Musculoskeletal:  Negative for arthralgias and myalgias.   Skin:  Negative for rash.   Neurological:  Negative for dizziness, weakness and light-headedness.   Psychiatric/Behavioral:  Negative for dysphoric mood.        Physical Exam:  Body mass index is 29.07 kg/m².  /82   Pulse 56   Ht 5' 8\" (1.727 m)   Wt 86.7 kg (191 lb 3.2 oz)   SpO2 99%   BMI 29.07 kg/m²    Wt Readings from Last 3 Encounters:   08/20/24 86.7 kg (191 lb 3.2 oz)   05/03/24 92.2 kg (203 lb 3.2 oz)   04/26/24 92.1 kg (203 lb)       Physical Exam  Vitals reviewed.   Constitutional:       General: She is not in acute distress.     Appearance: Normal appearance. She is not ill-appearing.   HENT:      Head: Normocephalic and atraumatic.      Right Ear: External ear normal.      Left Ear: External ear normal.      Nose: Nose normal.      Mouth/Throat:      Mouth: Mucous membranes are moist.      Pharynx: Oropharynx is clear.   Eyes:      Extraocular Movements: Extraocular movements intact.      Conjunctiva/sclera: Conjunctivae normal.      Pupils: Pupils are equal, round, and reactive to light.   Neck:      Thyroid: No thyroid mass, thyromegaly or thyroid tenderness.   Cardiovascular:      Rate and Rhythm: Normal rate and regular rhythm.      Heart sounds: Normal heart sounds. No murmur heard.  Pulmonary:      Effort: Pulmonary " "effort is normal. No respiratory distress.      Breath sounds: Normal breath sounds.   Abdominal:      General: Abdomen is flat. Bowel sounds are normal. There is no distension.      Palpations: Abdomen is soft.      Tenderness: There is no abdominal tenderness.   Musculoskeletal:      Right lower leg: No edema.      Left lower leg: No edema.   Skin:     General: Skin is warm and dry.   Neurological:      Mental Status: She is alert. Mental status is at baseline.   Psychiatric:         Mood and Affect: Mood normal.         Behavior: Behavior normal.           DATA:  Labs:       No results found for: \"TSH\", \"FREET4\", \"TSI\"  Lab Results   Component Value Date    SODIUM 140 08/01/2024    K 4.2 08/01/2024     08/01/2024    CO2 27 08/01/2024    BUN 10 08/01/2024    CREATININE 0.68 08/01/2024    CALCIUM 9.2 08/01/2024      Lab Results   Component Value Date    CALCIUM 9.2 08/01/2024      No results found for: \"LABPROT\"     Radiology    07/10/2024       Impression:  1. Age-related osteoporosis without current pathological fracture    2. Osteopenia of hip, unspecified laterality         Plan:    Diagnoses and all orders for this visit:    Age-related osteoporosis without current pathological fracture  -     Teriparatide, Recombinant, 600 MCG/2.4ML SOPN; Inject 20 mcg under the skin in the morning    Osteopenia of hip, unspecified laterality  -     Ambulatory referral to Endocrinology      Severe osteoporosis: Although patient has osteopenia by DEXA scan, her history of fragility fracture after a fall from standing height would qualify her for diagnosis of severe osteoporosis.  As such, she would benefit from anabolic therapy prior to pursuing any antiresorptives.  We discussed the various pharmacologic agents in this class including Forteo, Tymlos, and Evenity.  As she would like to avoid return trips to our clinic, she elected to pursue Forteo and this has been submitted to check for insurance approval.  She does not " have any history of metastatic bone disease or other contraindications to this.  We did discuss that she would be treated with this for period of time that would eventually require switching to an antiresorptive agent such as a bisphosphonate to maintain gains.  She was advised to continue with daily calcium and vitamin D intake of 1200 mcg and 1000 to 2000 units daily.  She will also work on incorporating more weightbearing and resistance training into her exercise routine.  We will plan to have her follow-up in 6 months.    Discussed with the patient and all questioned fully answered. She will call me if any problems arise.        Aakash Gongora MD

## 2024-08-26 ENCOUNTER — OFFICE VISIT (OUTPATIENT)
Dept: FAMILY MEDICINE CLINIC | Facility: CLINIC | Age: 59
End: 2024-08-26
Payer: COMMERCIAL

## 2024-08-26 ENCOUNTER — TELEPHONE (OUTPATIENT)
Age: 59
End: 2024-08-26

## 2024-08-26 VITALS
RESPIRATION RATE: 16 BRPM | HEART RATE: 55 BPM | HEIGHT: 68 IN | BODY MASS INDEX: 28.34 KG/M2 | SYSTOLIC BLOOD PRESSURE: 120 MMHG | DIASTOLIC BLOOD PRESSURE: 80 MMHG | TEMPERATURE: 97.5 F | OXYGEN SATURATION: 99 % | WEIGHT: 187 LBS

## 2024-08-26 DIAGNOSIS — M85.859 OSTEOPENIA OF HIP, UNSPECIFIED LATERALITY: ICD-10-CM

## 2024-08-26 DIAGNOSIS — Z98.890 S/P ORIF (OPEN REDUCTION INTERNAL FIXATION) FRACTURE: ICD-10-CM

## 2024-08-26 DIAGNOSIS — E78.49 OTHER HYPERLIPIDEMIA: Chronic | ICD-10-CM

## 2024-08-26 DIAGNOSIS — Z00.00 ENCOUNTER FOR WELLNESS EXAMINATION IN ADULT: Primary | ICD-10-CM

## 2024-08-26 DIAGNOSIS — Z87.81 S/P ORIF (OPEN REDUCTION INTERNAL FIXATION) FRACTURE: ICD-10-CM

## 2024-08-26 PROBLEM — E66.09 CLASS 1 OBESITY DUE TO EXCESS CALORIES WITHOUT SERIOUS COMORBIDITY WITH BODY MASS INDEX (BMI) OF 33.0 TO 33.9 IN ADULT: Status: RESOLVED | Noted: 2023-07-08 | Resolved: 2024-08-26

## 2024-08-26 PROBLEM — Z12.31 SCREENING MAMMOGRAM, ENCOUNTER FOR: Status: RESOLVED | Noted: 2018-10-01 | Resolved: 2024-08-26

## 2024-08-26 PROBLEM — E66.811 CLASS 1 OBESITY DUE TO EXCESS CALORIES WITHOUT SERIOUS COMORBIDITY WITH BODY MASS INDEX (BMI) OF 33.0 TO 33.9 IN ADULT: Status: RESOLVED | Noted: 2023-07-08 | Resolved: 2024-08-26

## 2024-08-26 PROCEDURE — 99396 PREV VISIT EST AGE 40-64: CPT | Performed by: FAMILY MEDICINE

## 2024-08-26 RX ORDER — ROSUVASTATIN CALCIUM 5 MG/1
5 TABLET, COATED ORAL DAILY
Qty: 100 TABLET | Refills: 1 | Status: SHIPPED | OUTPATIENT
Start: 2024-08-26

## 2024-08-26 NOTE — ASSESSMENT & PLAN NOTE
The patient has establish care with [facility] endocrinology.  We discussed Rx options.  Currently she was prescribed Forteo but seemingly Rx is not covered by her insurance.  She may be a good candidate for Evenity and will discuss with Dr. Reyes

## 2024-08-26 NOTE — ASSESSMENT & PLAN NOTE
Cholesterol is high 297 with LDL of 199 in spite of significant lifestyle changes and 62 pound weight loss within past year.  Family history of early CAD-father.  Likely familial hyperlipidemia.  Start Crestor 5 mg daily.  Repeat blood work in 3 months.

## 2024-08-26 NOTE — TELEPHONE ENCOUNTER
Patient called she stated she spoke with PCP and she would like to try evenity injection. Pt would like a call back with an update.

## 2024-08-26 NOTE — PATIENT INSTRUCTIONS
Please contact . [facility] and discuss option of Evenity (monthly injection for a year)  Please start low-dose Crestor 5 mg once a day.  Medication for high cholesterol  Please repeat blood work in 3 months/late November-early December  Please consider shingles vaccination.  Check with your insurance.  You can proceed at the local pharmacy or our office, schedule appointment with a nurse

## 2024-08-26 NOTE — PROGRESS NOTES
Ambulatory Visit  Name: Guerita Garcia      : 1965      MRN: [id number]  Encounter Provider: Bozena Caballero MD  Encounter Date: 2024   Encounter department: [facility]    Assessment & Plan   1. Encounter for wellness examination in adult  Comments:  The patient is up-to-date with health screenings  2. Other hyperlipidemia  Assessment & Plan:  Cholesterol is high 297 with LDL of 199 in spite of significant lifestyle changes and 62 pound weight loss within past year.  Family history of early CAD-father.  Likely familial hyperlipidemia.  Start Crestor 5 mg daily.  Repeat blood work in 3 months.  Orders:  -     rosuvastatin (CRESTOR) 5 mg tablet; Take 1 tablet (5 mg total) by mouth daily  -     Lipid Panel with Direct LDL reflex; Future  -     Hepatic function panel; Future  3. S/P ORIF (open reduction internal fixation) fracture  4. Osteopenia of hip, unspecified laterality  Assessment & Plan:  The patient has establish care with [facility].  We discussed Rx options.  Currently she was prescribed Forteo but seemingly Rx is not covered by her insurance.  She may be a good candidate for Evenity and will discuss with Dr. Reyes       Patient Instructions   Please contact [facility] and discuss option of Evenity (monthly injection for a year)  Please start low-dose Crestor 5 mg once a day.  Medication for high cholesterol  Please repeat blood work in 3 months/late November-early December  Please consider shingles vaccination.  Check with your insurance.  You can proceed at the local pharmacy or our office, schedule appointment with a nurse      History of Present Illness     Annual well exam  62 lb weight loss  Healthy diet and walking, she is training for half marathon/walking  Fhx-  father- MI in 40s  Rx with Forteo by , did not start Rx so far, seemingly it is not covered by patient's insurance.  History of left radius fracture in ,  status post ORIF.  Recent bone density scan revealed osteopenia.    No daily prescription medications.  No concerns.  Results of recent blood work reviewed.  Patient is up-to-date with health screenings (mammogram, colonoscopy, Pap smear)          Review of Systems   Constitutional: Negative.    HENT: Negative.     Eyes: Negative.    Respiratory: Negative.     Cardiovascular: Negative.    Gastrointestinal: Negative.    Endocrine: Negative.    Genitourinary: Negative.    Musculoskeletal: Negative.    Skin: Negative.    Allergic/Immunologic: Negative.    Neurological: Negative.    Hematological: Negative.    Psychiatric/Behavioral: Negative.       Past Medical History:   Diagnosis Date   • Bilateral fibrocystic breast changes    • Closed fracture distal radius and ulna, left, initial encounter 03/06/2023   • Costochondritis    • Dense breasts    • Headache    • Hemorrhoids    • Loss of height    • Sinusitis    • Urinary frequency    • Wears glasses    • Wears glasses      Past Surgical History:   Procedure Laterality Date   • COLONOSCOPY     • INCONTINENCE SURGERY      age 39, Dr Nye   • [place] COLONOSCOPY FLX DX W/COLLJ SPEC WHEN PFRMD N/A 10/04/2018    Procedure: COLONOSCOPY;  Surgeon: Flavio Rouse MD;  Location: [facility];  Service: Gastroenterology   • [place] OPEN TREATMENT RADIAL SHAFT FRACTURE Left 02/22/2023    Procedure: OPEN REDUCTION W/ INTERNAL FIXATION (ORIF) RADIUS;  Surgeon: Torsten Miller MD;  Location: [facility] MAIN OR;  Service: Orthopedics   • TUBAL LIGATION     • UMBILICAL HERNIA REPAIR       Family History   Problem Relation Age of Onset   • Anemia Mother    • Breast cancer Mother         AGE DX UNK   • Cancer Mother    • Coronary artery disease Father    • Melanoma Father         malignant of skin AGE DX UNK   • Leukemia Father         AGE DX UNK   • Alcohol abuse Father    • Breast cancer Sister         AGE DX UNK   • No Known Problems Daughter    • No Known Problems Maternal Grandmother    • No  Known Problems Maternal Grandfather    • No Known Problems Paternal Grandmother    • No Known Problems Paternal Grandfather    • No Known Problems Sister    • No Known Problems Brother    • No Known Problems Daughter    • Ovarian cancer Maternal Aunt 70   • Melanoma Paternal Uncle    • No Known Problems Paternal Aunt    • No Known Problems Paternal Aunt      Social History     Tobacco Use   • Smoking status: Never   • Smokeless tobacco: Never   Vaping Use   • Vaping status: Never Used   Substance and Sexual Activity   • Alcohol use: Yes     Alcohol/week: 1.0 standard drink of alcohol     Types: 1 Glasses of wine per week     Comment: Socially Weekends   • Drug use: Never   • Sexual activity: Not Currently     Partners: Male     Birth control/protection: Post-menopausal, None     Current Outpatient Medications on File Prior to Visit   Medication Sig   • CALCIUM PO Take by mouth   • Cholecalciferol (VITAMIN D3) 1000 units CAPS Take 2,000 Units by mouth daily     • co-enzyme Q-10 30 MG capsule Take 30 mg by mouth 3 (three) times a day   • Garlic 1000 MG CAPS Take 1,000 mg by mouth daily   • multivitamin (THERAGRAN) TABS Take 1 tablet by mouth daily   • Omega-3 Fatty Acids (fish oil) 1,000 mg Take 1,000 mg by mouth daily   • Teriparatide, Recombinant, 600 MCG/2.4ML SOPN Inject 20 mcg under the skin in the morning   • traZODone (DESYREL) 50 mg tablet TAKE 1 TO 2 TABLETS BY MOUTH AT BEDTIME AS NEEDED FOR INSOMNIA   • [DISCONTINUED] albuterol (PROVENTIL HFA,VENTOLIN HFA) 90 mcg/act inhaler Inhale 2 puffs every 4 (four) hours as needed for wheezing or shortness of breath (cough and wheeizng) (Patient not taking: Reported on 4/26/2024)   • [DISCONTINUED] benzonatate (TESSALON) 200 MG capsule Take 1 capsule (200 mg total) by mouth 3 (three) times a day as needed for cough (Patient not taking: Reported on 4/26/2024)     No Known Allergies  Immunization History   Administered Date(s) Administered   • COVID-19 PFIZER VACCINE 0.3  "ML IM 04/18/2021, 05/09/2021, 02/05/2022     Objective     /80   Pulse 55   Temp 97.5 °F (36.4 °C) (Temporal)   Resp 16   Ht 5' 8\" (1.727 m)   Wt 84.8 kg (187 lb)   SpO2 99%   BMI 28.43 kg/m²     Physical Exam  Vitals and nursing note reviewed.   Constitutional:       General: She is not in acute distress.     Appearance: Normal appearance. She is well-developed. She is not ill-appearing.   HENT:      Head: Normocephalic and atraumatic.   Eyes:      Conjunctiva/sclera: Conjunctivae normal.   Neck:      Thyroid: No thyromegaly.      Vascular: No carotid bruit.   Cardiovascular:      Rate and Rhythm: Normal rate and regular rhythm.      Heart sounds: Normal heart sounds. No murmur heard.  Pulmonary:      Effort: Pulmonary effort is normal. No respiratory distress.      Breath sounds: Normal breath sounds. No wheezing.   Abdominal:      General: Bowel sounds are normal. There is no distension or abdominal bruit.      Palpations: Abdomen is soft.      Tenderness: There is no abdominal tenderness.      Hernia: No hernia is present.   Musculoskeletal:         General: Normal range of motion.      Cervical back: Neck supple. No rigidity.      Right lower leg: No edema.      Left lower leg: No edema.   Skin:     General: Skin is warm.      Findings: No rash.   Neurological:      General: No focal deficit present.      Mental Status: She is alert and oriented to person, place, and time.      Cranial Nerves: No cranial nerve deficit.      Coordination: Coordination normal.   Psychiatric:         Mood and Affect: Mood normal.         Behavior: Behavior normal.         Thought Content: Thought content normal.         "

## 2024-08-28 ENCOUNTER — TELEPHONE (OUTPATIENT)
Dept: ENDOCRINOLOGY | Facility: CLINIC | Age: 59
End: 2024-08-28

## 2024-09-06 ENCOUNTER — HOSPITAL ENCOUNTER (OUTPATIENT)
Dept: ULTRASOUND IMAGING | Facility: CLINIC | Age: 59
Discharge: HOME | End: 2024-09-06
Payer: COMMERCIAL

## 2024-09-06 VITALS — BODY MASS INDEX: 28.34 KG/M2 | HEIGHT: 68 IN | WEIGHT: 187 LBS

## 2024-09-06 DIAGNOSIS — R92.30 DENSE BREASTS: ICD-10-CM

## 2024-09-06 DIAGNOSIS — N60.11 BILATERAL FIBROCYSTIC BREAST CHANGES: ICD-10-CM

## 2024-09-06 DIAGNOSIS — Z12.31 SCREENING MAMMOGRAM, ENCOUNTER FOR: ICD-10-CM

## 2024-09-06 DIAGNOSIS — N60.12 BILATERAL FIBROCYSTIC BREAST CHANGES: ICD-10-CM

## 2024-09-06 DIAGNOSIS — Z80.3 FAMILY HISTORY OF BREAST CANCER IN FEMALE: ICD-10-CM

## 2024-09-06 PROCEDURE — 76641 ULTRASOUND BREAST COMPLETE: CPT

## 2024-09-13 NOTE — PROGRESS NOTES
"Daily Note     Today's date: 5/3/2023  Patient name: Shelda Bamberger  : 1965  MRN: [id number]  Referring provider: Je Parker PA-C  Dx:   Encounter Diagnosis     ICD-10-CM    1  S/P ORIF (open reduction internal fixation) fracture  Z98 890     Z87 81                      Subjective: \"It's not bad\"      Objective: See treatment diary below      Assessment: Upgrades are progressive and tolerated well with only fatigue  Advancing as able  Plan: Continue per plan of care        Precautions: Fx  HEP: Scar mob, Wrist AROM/AAROM TGE, blocking; GTB WE/WF EF/EE      Manuals 4/20 4/27 5/3 3/30 4/3 4/11 4/13 4/18   IASTM 10 15 10 10 10' 10 10 10   KT                                 Neuro Re-Ed           Wrist maze    10x 10x 10x 10x    Wall walking           translation    coins        BlaPW 3x10,G3 sustained 30s x 4 BlaPW 3x10,G4 sustained 30s x 4 BlaPW 3x10,G4 sustained 30s x 4   BPW 3x10 pow/cyl BPW 3x10 pow/cyl BlaPW 3x10 pow/cyl   /pinch G3 BCP G4BCP G4BCP   G3/GCP G3/GCP G4/BCP   WF/WE/S/P GPB 3x10 GPB 3x10 GPB 3x10     RPB 3x10 RPB 3x15 GPB 3x10              Ther Ex             AAROM PROM 10 10 PROM 10 PROM 10 AA/PROm    10x 8s  ball roll into flexion/extension on table PROM 15 PROM 10 PROM 10   S/P Hammer 30s x 4   BPB (dowel) 3x 30s BPB (dowel)  3x30s hammer 30s x4 hammer 30s x4 hammer 30s x4   EDC yellow gummy 3x10  y gummy 3x15 30x edc light rb 30x edc heavier rb 30x lrb rb 30x lrb rb yellow gummy 3x10   Arm Bike   6m    6m 4 res    Sled push   50# 30' x 6                                         Ther Activity                                                                  Modalities           MHP 5 5 5 5 5                                "
- - -

## 2024-09-18 ENCOUNTER — TELEPHONE (OUTPATIENT)
Dept: ENDOCRINOLOGY | Facility: CLINIC | Age: 59
End: 2024-09-18

## 2024-09-18 NOTE — TELEPHONE ENCOUNTER
----- Message from Micah Reyes MD sent at 9/11/2024  2:20 PM EDT -----  Regarding: FW: Evenity  Please set up a peer to peer.  Thanks  ----- Message -----  From: Brisa Malave MA  Sent: 9/5/2024   8:08 AM EDT  To: Micah Reyes MD  Subject: RE: Evenity                                      Hi, I did not receive a denial letter, It was a voicemail mail. I called yesterday to ask for a denial letter and it's still pending.  Sorry about that.  ----- Message -----  From: Micah Reyes MD  Sent: 9/4/2024   3:23 PM EDT  To: Brisa Malave MA  Subject: RE: Evenity                                      Is the denial scanned into the chart?  I may have missed it.    Micah  ----- Message -----  From: Brisa Malave MA  Sent: 9/3/2024  12:06 PM EDT  To: Micah Reyes MD  Subject: Evenjoe Reynagalo,   I received a voicemail that patient's request for Evenity has been denied due to medical necessity, patient did not try and fail bisphosphonates.    There is an option for Peer to Peer by calling [phone] using denial #[id number].   Please let me know if you are calling for peer to peer and the outcome.   Thank you.

## 2024-09-18 NOTE — TELEPHONE ENCOUNTER
Called [facility] to scheduled the peer to peer   [phone] said to call [phone] spoke to Mateus BAIRES in RX side she said there is no denial for evenity on there end she called over to the medical side and they did not have one either she said can call the medical side to re start the p/a   At this time no peer to peer is needed   At this time need to start a new p/a for the evenity

## 2024-09-18 NOTE — TELEPHONE ENCOUNTER
Called [facility] to scheduled the peer to peer   [phone] said to call [phone] spoke to Mateus BAIRES in RX side she said there is no denial for evenity on there end she called over to the medical side and they did not have one either she said can call the medical side to re start the p/a   At this time no peer to peer is needed   At this time need to start a new p/a for the evenity

## 2024-09-20 ENCOUNTER — TELEPHONE (OUTPATIENT)
Dept: ENDOCRINOLOGY | Facility: CLINIC | Age: 59
End: 2024-09-20

## 2024-09-20 NOTE — TELEPHONE ENCOUNTER
Hollie from [employer] called to say we need to leave message on a different # [phone] gave option 1,3,2,2,3 she transfer me to that line TC

## 2024-09-20 NOTE — TELEPHONE ENCOUNTER
Called in regards to the denial (Evenity auth # [id number]) [phone] was sent back to [phone]   Spoke to Dinora who advise still did not see any denial letter she place me on hold and after her and her senior rep helping they were able to put me in contact with Ashley from the medical side of the PT insurance who advise will need to leave a voice mail with the following info for a peer to peer [phone]   Name of Provider   Best # to reach them   Time the provider is NOT available  The Member ID # Name     The Auth / Denial # for the peer to peer     -------   Left info on voicemail

## 2024-10-03 ENCOUNTER — OFFICE VISIT (OUTPATIENT)
Dept: FAMILY MEDICINE CLINIC | Facility: CLINIC | Age: 59
End: 2024-10-03
Payer: COMMERCIAL

## 2024-10-03 VITALS
SYSTOLIC BLOOD PRESSURE: 110 MMHG | RESPIRATION RATE: 16 BRPM | HEIGHT: 68 IN | HEART RATE: 70 BPM | OXYGEN SATURATION: 95 % | WEIGHT: 187 LBS | BODY MASS INDEX: 28.34 KG/M2 | DIASTOLIC BLOOD PRESSURE: 66 MMHG | TEMPERATURE: 97.8 F

## 2024-10-03 DIAGNOSIS — U07.1 COVID-19 VIRUS INFECTION: Primary | ICD-10-CM

## 2024-10-03 DIAGNOSIS — L30.9 DERMATITIS: ICD-10-CM

## 2024-10-03 PROCEDURE — 99213 OFFICE O/P EST LOW 20 MIN: CPT | Performed by: FAMILY MEDICINE

## 2024-10-03 RX ORDER — TRIAMCINOLONE ACETONIDE 1 MG/G
CREAM TOPICAL 2 TIMES DAILY
Qty: 453.6 G | Refills: 0 | Status: SHIPPED | OUTPATIENT
Start: 2024-10-03

## 2024-10-03 RX ORDER — BENZONATATE 200 MG/1
200 CAPSULE ORAL 3 TIMES DAILY PRN
Qty: 30 CAPSULE | Refills: 0 | Status: SHIPPED | OUTPATIENT
Start: 2024-10-03

## 2024-10-03 RX ORDER — NIRMATRELVIR AND RITONAVIR 300-100 MG
3 KIT ORAL 2 TIMES DAILY
Qty: 30 TABLET | Refills: 0 | Status: SHIPPED | OUTPATIENT
Start: 2024-10-03 | End: 2024-10-08

## 2024-10-03 NOTE — PATIENT INSTRUCTIONS
Paxlovid x 5 days  NO CHOLESTEROL Rx while on Paxlovid  Albuterol inhaler HFA 2 puffs every 4-6 hours  Tessalon Perles for cough   Tylenol/Advil  Steroid cream as needed for rash  Zyrtec 10 mg once a day for rash

## 2024-10-03 NOTE — PROGRESS NOTES
Ambulatory Visit  Name: Guerita Garcia      : 1965      MRN: [id number]  Encounter Provider: Bozena Caballero MD  Encounter Date: 10/3/2024   Encounter department: [facility]    Assessment & Plan  COVID-19 virus infection  COVID test in the office is positive  I suspect that patient's COVID-19 symptoms started yesterday  Start Paxlovid for 5 days  PRN benzonate for cough  Rest, fluids  Hold statin while on Paxlovid  Orders:    nirmatrelvir & ritonavir (Paxlovid, 300/100,) tablet therapy pack; Take 3 tablets by mouth 2 (two) times a day for 5 days Take 2 nirmatrelvir tablets + 1 ritonavir tablet together per dose    benzonatate (TESSALON) 200 MG capsule; Take 1 capsule (200 mg total) by mouth 3 (three) times a day as needed for cough    Dermatitis    Orders:    triamcinolone (KENALOG) 0.1 % cream; Apply topically 2 (two) times a day       Patient Instructions   Paxlovid x 5 days  NO CHOLESTEROL Rx while on Paxlovid  Albuterol inhaler HFA 2 puffs every 4-6 hours  Tessalon Perles for cough   Tylenol/Advil  Steroid cream as needed for rash  Zyrtec 10 mg once a day for rash    History of Present Illness     Just returned from [place]  Developed mild cold symptoms on 24, took OTC cold and cough Rx - felt better  Symptoms are worse since yesterday with new onset of fever yesterday 100.6  Tested yesterday- COVID was positive  Cough is worse, wet. No chest tightness, no SOB, no wheezing   Tired.  Appetite- Off  No GI symptoms  Complains of sore throat and congestion   -  same symptoms  Rash appeared 10/2/24-  very pruritic - abdomen , lower chest and back      Rash  This is a new problem. The current episode started in the past 7 days. The problem has been gradually worsening since onset. The affected locations include the scalp, chest, torso, back, abdomen, groin, left shoulder, left leg, right shoulder and right leg. The rash is characterized by redness and itchiness.  She was exposed to nothing. Associated symptoms include congestion, coughing, fatigue, joint pain and a sore throat. Pertinent negatives include no anorexia, diarrhea, facial edema, fever, rhinorrhea, shortness of breath or vomiting.     Review of Systems   Constitutional:  Positive for fatigue. Negative for fever.   HENT:  Positive for congestion and sore throat. Negative for rhinorrhea.    Eyes:  Negative for pain.   Respiratory:  Positive for cough. Negative for chest tightness, shortness of breath and wheezing.    Cardiovascular: Negative.    Gastrointestinal: Negative.  Negative for anorexia, diarrhea and vomiting.   Musculoskeletal:  Positive for joint pain.   Skin:  Positive for rash.     Past Medical History:   Diagnosis Date    Bilateral fibrocystic breast changes     Closed fracture distal radius and ulna, left, initial encounter 03/06/2023    Costochondritis     Dense breasts     Headache     Hemorrhoids     Loss of height     Sinusitis     Urinary frequency     Wears glasses     Wears glasses      Past Surgical History:   Procedure Laterality Date    COLONOSCOPY      INCONTINENCE SURGERY      age 39, Dr Nye    [place] COLONOSCOPY FLX DX W/COLLJ SPEC WHEN PFRMD N/A 10/04/2018    Procedure: COLONOSCOPY;  Surgeon: Flavio Rouse MD;  [facility];  Service: Gastroenterology    [place] OPEN TREATMENT RADIAL SHAFT FRACTURE Left 02/22/2023    Procedure: OPEN REDUCTION W/ INTERNAL FIXATION (ORIF) RADIUS;  Surgeon: Torsten Miller MD;  Location: [facility] MAIN OR;  Service: Orthopedics    TUBAL LIGATION      UMBILICAL HERNIA REPAIR       Family History   Problem Relation Age of Onset    Anemia Mother     Breast cancer Mother         AGE DX UNK    Cancer Mother     Coronary artery disease Father     Melanoma Father         malignant of skin AGE DX UNK    Leukemia Father         AGE DX UNK    Alcohol abuse Father     Breast cancer Sister         AGE DX UNK    No Known Problems Daughter     No Known Problems  "Maternal Grandmother     No Known Problems Maternal Grandfather     No Known Problems Paternal Grandmother     No Known Problems Paternal Grandfather     No Known Problems Sister     No Known Problems Brother     No Known Problems Daughter     Ovarian cancer Maternal Aunt 70    Melanoma Paternal Uncle     No Known Problems Paternal Aunt     No Known Problems Paternal Aunt      Social History     Tobacco Use    Smoking status: Never    Smokeless tobacco: Never   Vaping Use    Vaping status: Never Used   Substance and Sexual Activity    Alcohol use: Yes     Alcohol/week: 1.0 standard drink of alcohol     Types: 1 Glasses of wine per week     Comment: Socially Weekends    Drug use: Never    Sexual activity: Not Currently     Partners: Male     Birth control/protection: Post-menopausal, None     Current Outpatient Medications on File Prior to Visit   Medication Sig    CALCIUM PO Take by mouth    Cholecalciferol (VITAMIN D3) 1000 units CAPS Take 2,000 Units by mouth daily      co-enzyme Q-10 30 MG capsule Take 30 mg by mouth 3 (three) times a day    Garlic 1000 MG CAPS Take 1,000 mg by mouth daily    multivitamin (THERAGRAN) TABS Take 1 tablet by mouth daily    Omega-3 Fatty Acids (fish oil) 1,000 mg Take 1,000 mg by mouth daily    rosuvastatin (CRESTOR) 5 mg tablet Take 1 tablet (5 mg total) by mouth daily    Teriparatide, Recombinant, 600 MCG/2.4ML SOPN Inject 20 mcg under the skin in the morning    traZODone (DESYREL) 50 mg tablet TAKE 1 TO 2 TABLETS BY MOUTH AT BEDTIME AS NEEDED FOR INSOMNIA     No Known Allergies  Immunization History   Administered Date(s) Administered    COVID-19 PFIZER VACCINE 0.3 ML IM 04/18/2021, 05/09/2021, 02/05/2022     Objective     /66 (BP Location: Left arm, Patient Position: Sitting, Cuff Size: Large)   Pulse 70   Temp 97.8 °F (36.6 °C) (Temporal)   Resp 16   Ht 5' 8\" (1.727 m)   Wt 84.8 kg (187 lb)   SpO2 95%   BMI 28.43 kg/m²     Physical Exam  Vitals and nursing note " reviewed.   Constitutional:       General: She is not in acute distress.     Appearance: Normal appearance. She is well-developed. She is not ill-appearing.   HENT:      Head: Normocephalic and atraumatic.      Right Ear: Tympanic membrane normal.      Left Ear: Tympanic membrane normal.      Nose: Mucosal edema and congestion present.      Mouth/Throat:      Mouth: Mucous membranes are moist.      Pharynx: Posterior oropharyngeal erythema present.   Eyes:      Conjunctiva/sclera: Conjunctivae normal.   Cardiovascular:      Rate and Rhythm: Normal rate and regular rhythm.      Heart sounds: Normal heart sounds. No murmur heard.  Pulmonary:      Effort: Pulmonary effort is normal. No respiratory distress.      Breath sounds: Normal breath sounds.   Musculoskeletal:      Cervical back: Neck supple.   Skin:     Comments: Fine erythematous pruritic rash abdomen, chest, back.  No rash face or limbs.   Neurological:      Mental Status: She is alert and oriented to person, place, and time.      Cranial Nerves: No cranial nerve deficit.      Deep Tendon Reflexes: Reflexes are normal and symmetric.   Psychiatric:         Mood and Affect: Mood normal.         Behavior: Behavior normal.         Thought Content: Thought content normal.

## 2024-10-30 ENCOUNTER — ANNUAL EXAM (OUTPATIENT)
Dept: OBGYN CLINIC | Facility: CLINIC | Age: 59
End: 2024-10-30
Payer: COMMERCIAL

## 2024-10-30 VITALS
BODY MASS INDEX: 27.96 KG/M2 | WEIGHT: 188.8 LBS | SYSTOLIC BLOOD PRESSURE: 126 MMHG | HEIGHT: 69 IN | DIASTOLIC BLOOD PRESSURE: 72 MMHG

## 2024-10-30 DIAGNOSIS — N60.11 BILATERAL FIBROCYSTIC BREAST CHANGES: ICD-10-CM

## 2024-10-30 DIAGNOSIS — Z98.890 HISTORY OF SUBURETHRAL SLING PROCEDURE: ICD-10-CM

## 2024-10-30 DIAGNOSIS — N60.12 BILATERAL FIBROCYSTIC BREAST CHANGES: ICD-10-CM

## 2024-10-30 DIAGNOSIS — R92.30 DENSE BREASTS: ICD-10-CM

## 2024-10-30 DIAGNOSIS — Z12.31 SCREENING MAMMOGRAM, ENCOUNTER FOR: ICD-10-CM

## 2024-10-30 DIAGNOSIS — Z01.419 ENCOUNTER FOR ANNUAL ROUTINE GYNECOLOGICAL EXAMINATION: Primary | ICD-10-CM

## 2024-10-30 DIAGNOSIS — Z80.3 FAMILY HISTORY OF BREAST CANCER IN FEMALE: ICD-10-CM

## 2024-10-30 PROCEDURE — G0145 SCR C/V CYTO,THINLAYER,RESCR: HCPCS | Performed by: OBSTETRICS & GYNECOLOGY

## 2024-10-30 PROCEDURE — S0612 ANNUAL GYNECOLOGICAL EXAMINA: HCPCS | Performed by: OBSTETRICS & GYNECOLOGY

## 2024-11-04 LAB
LAB AP GYN PRIMARY INTERPRETATION: NORMAL
Lab: NORMAL

## 2024-12-31 ENCOUNTER — APPOINTMENT (OUTPATIENT)
Dept: LAB | Facility: CLINIC | Age: 59
End: 2024-12-31
Payer: COMMERCIAL

## 2024-12-31 DIAGNOSIS — E78.49 OTHER HYPERLIPIDEMIA: Chronic | ICD-10-CM

## 2024-12-31 LAB
ALBUMIN SERPL BCG-MCNC: 4.2 G/DL (ref 3.5–5)
ALP SERPL-CCNC: 72 U/L (ref 34–104)
ALT SERPL W P-5'-P-CCNC: 34 U/L (ref 7–52)
AST SERPL W P-5'-P-CCNC: 19 U/L (ref 13–39)
BILIRUB DIRECT SERPL-MCNC: 0.16 MG/DL (ref 0–0.2)
BILIRUB SERPL-MCNC: 1.01 MG/DL (ref 0.2–1)
CHOLEST SERPL-MCNC: 220 MG/DL (ref ?–200)
HDLC SERPL-MCNC: 77 MG/DL
LDLC SERPL CALC-MCNC: 128 MG/DL (ref 0–100)
PROT SERPL-MCNC: 6.9 G/DL (ref 6.4–8.4)
TRIGL SERPL-MCNC: 75 MG/DL (ref ?–150)

## 2024-12-31 PROCEDURE — 36415 COLL VENOUS BLD VENIPUNCTURE: CPT

## 2024-12-31 PROCEDURE — 80061 LIPID PANEL: CPT

## 2024-12-31 PROCEDURE — 80076 HEPATIC FUNCTION PANEL: CPT

## 2025-01-03 ENCOUNTER — RESULTS FOLLOW-UP (OUTPATIENT)
Dept: FAMILY MEDICINE CLINIC | Facility: CLINIC | Age: 60
End: 2025-01-03

## 2025-01-03 DIAGNOSIS — G47.00 INSOMNIA, UNSPECIFIED TYPE: ICD-10-CM

## 2025-01-03 DIAGNOSIS — E55.9 VITAMIN D DEFICIENCY: Primary | Chronic | ICD-10-CM

## 2025-01-03 DIAGNOSIS — E78.49 OTHER HYPERLIPIDEMIA: Chronic | ICD-10-CM

## 2025-01-03 RX ORDER — ROSUVASTATIN CALCIUM 10 MG/1
10 TABLET, COATED ORAL DAILY
Qty: 100 TABLET | Refills: 1 | Status: SHIPPED | OUTPATIENT
Start: 2025-01-03

## 2025-01-03 RX ORDER — TRAZODONE HYDROCHLORIDE 50 MG/1
50-100 TABLET, FILM COATED ORAL
Qty: 60 TABLET | Refills: 5 | Status: SHIPPED | OUTPATIENT
Start: 2025-01-03

## 2025-02-02 DIAGNOSIS — L30.9 DERMATITIS: ICD-10-CM

## 2025-02-03 RX ORDER — TRIAMCINOLONE ACETONIDE 1 MG/G
1 CREAM TOPICAL 2 TIMES DAILY
Qty: 454 G | Refills: 1 | Status: SHIPPED | OUTPATIENT
Start: 2025-02-03

## 2025-04-29 ENCOUNTER — HOSPITAL ENCOUNTER (OUTPATIENT)
Dept: RADIOLOGY | Age: 60
Discharge: HOME | End: 2025-04-29
Payer: COMMERCIAL

## 2025-04-29 DIAGNOSIS — Z12.31 SCREENING MAMMOGRAM, ENCOUNTER FOR: ICD-10-CM

## 2025-04-29 PROCEDURE — 77063 BREAST TOMOSYNTHESIS BI: CPT

## 2025-04-29 PROCEDURE — 77067 SCR MAMMO BI INCL CAD: CPT

## 2025-04-30 ENCOUNTER — TELEPHONE (OUTPATIENT)
Dept: SURGICAL ONCOLOGY | Facility: CLINIC | Age: 60
End: 2025-04-30

## 2025-04-30 NOTE — TELEPHONE ENCOUNTER
Called [facility], left voice message to have mammo screening read prior to patient's appointment 5/2/25 with SHELBY Cuevas.

## 2025-05-02 ENCOUNTER — OFFICE VISIT (OUTPATIENT)
Dept: SURGICAL ONCOLOGY | Facility: CLINIC | Age: 60
End: 2025-05-02
Payer: COMMERCIAL

## 2025-05-02 VITALS
OXYGEN SATURATION: 97 % | TEMPERATURE: 97.4 F | BODY MASS INDEX: 28.29 KG/M2 | DIASTOLIC BLOOD PRESSURE: 76 MMHG | HEIGHT: 69 IN | HEART RATE: 56 BPM | SYSTOLIC BLOOD PRESSURE: 138 MMHG | WEIGHT: 191 LBS

## 2025-05-02 DIAGNOSIS — Z91.89 INCREASED RISK OF BREAST CANCER: ICD-10-CM

## 2025-05-02 DIAGNOSIS — R92.30 DENSE BREASTS: Primary | ICD-10-CM

## 2025-05-02 PROCEDURE — 99213 OFFICE O/P EST LOW 20 MIN: CPT

## 2025-05-02 NOTE — ASSESSMENT & PLAN NOTE
The patient will continue with ABUS in addition to annual screening mammograms, as ordered by her gynecologist. I can see her on an as-needed basis.

## 2025-05-02 NOTE — PROGRESS NOTES
"Name: Guerita Garcia      : 1965      MRN: [id number]  Encounter Provider: SHELBY Hall  Encounter Date: 2025   Encounter department: [facility]  :  Assessment & Plan  Dense breasts  The patient will continue with ABUS in addition to annual screening mammograms, as ordered by her gynecologist. I can see her on an as-needed basis.       Increased risk of breast cancer  There are no concerning findings on exam today, and mammogram was unremarkable.           History of Present Illness   Guerita Garcia is a 59 y.o. year old female who presents today for dense breast follow-up.  She denies any changes on self-breast exam. She has not noticed any new lumps, skin changes, pain or tenderness. Mammogram was performed on , BIRADS-1, category c density.  She is scheduled for ABUS in September.       Review of Systems A complete review of systems is negative other than that noted above in the HPI.         Objective   /76   Pulse 56   Temp (!) 97.4 °F (36.3 °C)   Ht 5' 8.5\" (1.74 m)   Wt 86.6 kg (191 lb)   SpO2 97%   BMI 28.62 kg/m²     Pain Screening:  Pain Score: 0-No pain  ECOG    Physical Exam  Vitals reviewed.   Constitutional:       General: She is not in acute distress.     Appearance: Normal appearance. She is not ill-appearing or toxic-appearing.   HENT:      Head: Normocephalic and atraumatic.   Eyes:      General: No scleral icterus.  Cardiovascular:      Rate and Rhythm: Normal rate.   Pulmonary:      Effort: Pulmonary effort is normal.   Chest:   Breasts:     Right: Normal.      Left: Normal.      Comments: Breasts are smooth and symmetric bilaterally. There are no dominant masses, nodules, skin changes or tenderness on exam. I do not appreciate any adenopathy.  Musculoskeletal:         General: Normal range of motion.      Cervical back: Normal range of motion and neck supple.   Lymphadenopathy:      Cervical: No cervical " adenopathy.      Upper Body:      Right upper body: No supraclavicular, axillary or pectoral adenopathy.      Left upper body: No supraclavicular, axillary or pectoral adenopathy.   Skin:     General: Skin is warm and dry.   Neurological:      General: No focal deficit present.      Mental Status: She is alert and oriented to person, place, and time.   Psychiatric:         Mood and Affect: Mood normal.         Behavior: Behavior normal.         Thought Content: Thought content normal.         Judgment: Judgment normal.            Radiology Results Review: I have reviewed radiology reports from 4/29/2025 including: 3S screening mammogram.    Mammo screening bilateral w 3d & cad    Narrative & Impression   DIAGNOSIS: Screening mammogram, encounter for      TECHNIQUE:  Digital screening mammography was performed. Computer Aided Detection (CAD) analyzed all applicable images.   COMPARISONS: Prior breast imaging dated: 09/06/2024, 04/23/2024, 09/05/2023, 04/19/2023, 08/25/2022, 02/16/2022, 08/25/2021, 02/11/2021, 08/24/2020, 02/03/2020, 08/23/2019, 08/22/2018, 02/23/2018, 08/16/2017, 02/17/2017, 03/04/2016, 02/12/2016, and 12/18/2013     RELEVANT HISTORY:   Family Breast Cancer History: History of breast cancer in Mother, Sister.  Family Medical History: Family medical history includes breast cancer in 2 relatives (mother, sister) and ovarian cancer in maternal aunt.   Personal History: Hormone history includes birth control. No known relevant surgical history. Medical history includes fibrocystic breast.     The patient is scheduled in a reminder system for screening mammography.     8-10% of cancers will be missed on mammography. Management of a palpable abnormality must be based on clinical grounds.  Patients will be notified of their results via letter from our facility. Accredited by [employer] and FDA.     RISK ASSESSMENT:   5 Year Tyrer-Cuzick: 4.09%  10 Year Tyrer-Cuzick: 8.4%  Lifetime  Efrain: 20%     TISSUE DENSITY:   The breasts are heterogeneously dense, which may obscure small masses.      INDICATION: Guerita Garcia is a 59 y.o. female presenting for screening mammography.     FINDINGS:   There are no suspicious masses, grouped microcalcifications or areas of architectural distortion. The skin and nipple areolar complex are unremarkable.     IMPRESSION:  No mammographic evidence of malignancy.     ASSESSMENT/BI-RADS CATEGORY:  Left: 1 - Negative  Right: 1 - Negative  Overall: 1 - Negative     RECOMMENDATION:       - Routine screening mammogram in 1 year for both breasts.

## 2025-06-30 DIAGNOSIS — G47.00 INSOMNIA, UNSPECIFIED TYPE: ICD-10-CM

## 2025-07-01 DIAGNOSIS — E78.49 OTHER HYPERLIPIDEMIA: Chronic | ICD-10-CM

## 2025-07-01 RX ORDER — TRAZODONE HYDROCHLORIDE 50 MG/1
50-100 TABLET ORAL
Qty: 60 TABLET | Refills: 0 | Status: SHIPPED | OUTPATIENT
Start: 2025-07-01

## 2025-07-02 RX ORDER — ROSUVASTATIN CALCIUM 10 MG/1
10 TABLET, COATED ORAL DAILY
Qty: 90 TABLET | Refills: 0 | Status: SHIPPED | OUTPATIENT
Start: 2025-07-02

## 2025-07-02 NOTE — TELEPHONE ENCOUNTER
Patient needs an appointment. Please contact the patient to schedule an appointment. Last office visit: 10/03/24. Patient needs a 6 month appointment. Courtesy refill provided.

## 2025-07-29 DIAGNOSIS — G47.00 INSOMNIA, UNSPECIFIED TYPE: ICD-10-CM

## 2025-07-30 RX ORDER — TRAZODONE HYDROCHLORIDE 50 MG/1
50-100 TABLET ORAL
Qty: 60 TABLET | Refills: 0 | Status: SHIPPED | OUTPATIENT
Start: 2025-07-30

## 2025-07-31 ENCOUNTER — OFFICE VISIT (OUTPATIENT)
Dept: FAMILY MEDICINE CLINIC | Facility: CLINIC | Age: 60
End: 2025-07-31
Payer: COMMERCIAL

## 2025-07-31 ENCOUNTER — TELEPHONE (OUTPATIENT)
Age: 60
End: 2025-07-31

## 2025-07-31 VITALS
DIASTOLIC BLOOD PRESSURE: 62 MMHG | WEIGHT: 198.4 LBS | SYSTOLIC BLOOD PRESSURE: 122 MMHG | OXYGEN SATURATION: 96 % | HEART RATE: 56 BPM | TEMPERATURE: 97.6 F | RESPIRATION RATE: 16 BRPM | HEIGHT: 69 IN | BODY MASS INDEX: 29.38 KG/M2

## 2025-07-31 DIAGNOSIS — M81.0 AGE-RELATED OSTEOPOROSIS WITHOUT CURRENT PATHOLOGICAL FRACTURE: ICD-10-CM

## 2025-07-31 DIAGNOSIS — G89.29 CHRONIC FOOT PAIN, LEFT: ICD-10-CM

## 2025-07-31 DIAGNOSIS — Z98.890 S/P ORIF (OPEN REDUCTION INTERNAL FIXATION) FRACTURE: ICD-10-CM

## 2025-07-31 DIAGNOSIS — M79.672 CHRONIC FOOT PAIN, LEFT: ICD-10-CM

## 2025-07-31 DIAGNOSIS — Z00.00 HEALTHCARE MAINTENANCE: ICD-10-CM

## 2025-07-31 DIAGNOSIS — E78.49 OTHER HYPERLIPIDEMIA: Primary | Chronic | ICD-10-CM

## 2025-07-31 DIAGNOSIS — Z87.81 S/P ORIF (OPEN REDUCTION INTERNAL FIXATION) FRACTURE: ICD-10-CM

## 2025-07-31 PROCEDURE — 99214 OFFICE O/P EST MOD 30 MIN: CPT | Performed by: FAMILY MEDICINE

## 2025-08-03 PROBLEM — L29.9 SKIN PRURITUS: Status: RESOLVED | Noted: 2021-06-13 | Resolved: 2025-08-03

## 2025-08-03 PROBLEM — M81.0 AGE-RELATED OSTEOPOROSIS WITHOUT CURRENT PATHOLOGICAL FRACTURE: Status: ACTIVE | Noted: 2024-07-16

## 2025-08-09 ENCOUNTER — APPOINTMENT (OUTPATIENT)
Dept: LAB | Facility: CLINIC | Age: 60
End: 2025-08-09
Payer: COMMERCIAL

## 2025-08-18 ENCOUNTER — OFFICE VISIT (OUTPATIENT)
Dept: ENDOCRINOLOGY | Facility: CLINIC | Age: 60
End: 2025-08-18
Payer: COMMERCIAL

## 2025-08-18 ENCOUNTER — TELEPHONE (OUTPATIENT)
Dept: ENDOCRINOLOGY | Facility: CLINIC | Age: 60
End: 2025-08-18

## 2025-08-18 VITALS
BODY MASS INDEX: 29.72 KG/M2 | HEIGHT: 69 IN | SYSTOLIC BLOOD PRESSURE: 142 MMHG | OXYGEN SATURATION: 98 % | RESPIRATION RATE: 16 BRPM | DIASTOLIC BLOOD PRESSURE: 84 MMHG | HEART RATE: 57 BPM

## 2025-08-18 DIAGNOSIS — M81.0 AGE-RELATED OSTEOPOROSIS WITHOUT CURRENT PATHOLOGICAL FRACTURE: Primary | ICD-10-CM

## 2025-08-18 PROCEDURE — 99214 OFFICE O/P EST MOD 30 MIN: CPT

## (undated) DEVICE — GLOVE SRG BIOGEL ECLIPSE 7

## (undated) DEVICE — INTENDED FOR TISSUE SEPARATION, AND OTHER PROCEDURES THAT REQUIRE A SHARP SURGICAL BLADE TO PUNCTURE OR CUT.: Brand: BARD-PARKER ® CARBON RIB-BACK BLADES

## (undated) DEVICE — SUT MONOCRYL 3-0 SH 27 IN Y416H

## (undated) DEVICE — SUT MONOCRYL 4-0 PS-2 18 IN Y496G

## (undated) DEVICE — PADDING CAST 2IN COTTON STRL

## (undated) DEVICE — CUFF TOURNIQUET 18 X 4 IN QUICK CONNECT DISP 1 BLADDER

## (undated) DEVICE — CAST PADDING 4 IN SYNTHETIC STRL

## (undated) DEVICE — TUBING SUCTION 5MM X 12 FT

## (undated) DEVICE — .054" X 6" GUIDE WIRE
Type: IMPLANTABLE DEVICE | Site: WRIST | Status: NON-FUNCTIONAL
Brand: ACUMED
Removed: 2023-02-22

## (undated) DEVICE — GAUZE SPONGES,USP TYPE VII GAUZE, 12 PLY: Brand: CURITY

## (undated) DEVICE — ADHESIVE SKIN HIGH VISCOSITY EXOFIN 1ML

## (undated) DEVICE — GLOVE INDICATOR PI UNDERGLOVE SZ 7 BLUE

## (undated) DEVICE — 2.0MM QUICK RELEASE DRILL: Brand: ACUMED

## (undated) DEVICE — DISPOSABLE EQUIPMENT COVER: Brand: SMALL TOWEL DRAPE

## (undated) DEVICE — 2.8MM X 5" QUICK RELEASE DRILL: Brand: ACUMED

## (undated) DEVICE — ACE WRAP 3 IN UNSTERILE

## (undated) DEVICE — OCCLUSIVE GAUZE STRIP,3% BISMUTH TRIBROMOPHENATE IN PETROLATUM BLEND: Brand: XEROFORM

## (undated) DEVICE — STERILE BETHLEHEM PLASTIC HAND: Brand: CARDINAL HEALTH

## (undated) DEVICE — IMPERVIOUS STOCKINETTE: Brand: DEROYAL

## (undated) DEVICE — DRAPE C-ARM X-RAY

## (undated) DEVICE — ACE WRAP 2 IN UNSTERILE

## (undated) DEVICE — SPONGE SCRUB 4 PCT CHLORHEXIDINE